# Patient Record
Sex: FEMALE | Race: WHITE | NOT HISPANIC OR LATINO | URBAN - METROPOLITAN AREA
[De-identification: names, ages, dates, MRNs, and addresses within clinical notes are randomized per-mention and may not be internally consistent; named-entity substitution may affect disease eponyms.]

---

## 2023-08-10 ENCOUNTER — HOSPITAL ENCOUNTER (OUTPATIENT)
Dept: DATA CONVERSION | Facility: HOSPITAL | Age: 38
Discharge: HOME | End: 2023-08-10

## 2023-08-10 DIAGNOSIS — E03.9 HYPOTHYROIDISM, UNSPECIFIED: ICD-10-CM

## 2023-08-10 LAB — T3FREE SERPL-MCNC: 6.2 PG/ML (ref 2.3–4.1)

## 2023-09-07 RX ORDER — INSULIN ASPART 100 [IU]/ML
INJECTION, SOLUTION INTRAVENOUS; SUBCUTANEOUS
COMMUNITY
Start: 2023-07-31

## 2023-09-07 RX ORDER — BLOOD-GLUCOSE,RECEIVER,CONT
EACH MISCELLANEOUS
COMMUNITY
Start: 2022-11-03

## 2023-09-07 RX ORDER — BLOOD-GLUCOSE SENSOR
EACH MISCELLANEOUS
COMMUNITY
Start: 2022-12-15 | End: 2024-01-07 | Stop reason: SDUPTHER

## 2023-09-07 RX ORDER — INSULIN PMP CART,AUT,G6/7,CNTR
EACH SUBCUTANEOUS
COMMUNITY
Start: 2022-11-03 | End: 2023-12-18 | Stop reason: WASHOUT

## 2023-09-07 RX ORDER — BLOOD-GLUCOSE TRANSMITTER
EACH MISCELLANEOUS
COMMUNITY
Start: 2023-07-18 | End: 2024-01-07 | Stop reason: SDUPTHER

## 2023-09-07 RX ORDER — INSULIN PMP CART,AUT,G6/7,CNTR
EACH SUBCUTANEOUS
COMMUNITY
Start: 2023-05-27 | End: 2023-12-18 | Stop reason: WASHOUT

## 2023-09-07 NOTE — PROGRESS NOTES
"Erica Carcamo is a 37 y.o. female was referred to Clinical Pharmacy Team to complete a comprehensive medication review (CMR) with a pharmacist as part of the [employer] diabetes program.      Referring Provider: Dr. Bain (endo)    Subjective   Not on File  No Pharmacies Listed    Diabetes  She has type 1 diabetes mellitus. Her disease course has been stable. Current diabetic treatment includes intensive insulin program. She is compliant with treatment all of the time.       Objective     There were no vitals taken for this visit.     LAB  No results found for: \"BILITOT\", \"CALCIUM\", \"CO2\", \"CL\", \"CREATININE\", \"GLUCOSE\", \"ALKPHOS\", \"K\", \"PROT\", \"NA\", \"AST\", \"ALT\", \"BUN\", \"ANIONGAP\", \"MG\", \"PHOS\", \"GGT\", \"LDH\", \"ALBUMIN\", \"AMYLASE\", \"LIPASE\", \"GFRF\", \"GFRMALE\"  No results found for: \"TRIG\", \"CHOL\", \"LDLCALC\", \"HDL\"  No results found for: \"HGBA1C\"    Current Outpatient Medications on File Prior to Visit   Medication Sig Dispense Refill    Dexcom G6  misc       Dexcom G6 Sensor device       Dexcom G6 Transmitter device       insulin aspart (NovoLOG) 100 unit/mL injection Use via pump      Omnipod 5 G6 Intro Kit, Gen 5, cartridge       Omnipod 5 G6 Pods, Gen 5, cartridge        No current facility-administered medications on file prior to visit.      ASSESSMENT/PLAN:  - Patient enrolled in  Employee diabetes program for $0 co-pays on diabetes medications/supplies. Enrollment should be active in 2-4 weeks and will be valid for one year dependant upon patient remaining on  prescription insurance plan and filling at a  pharmacy. After 1 year, patient will require another consult with the clinical pharmacy team.    Assessment/Plan   Problem List Items Addressed This Visit    None       Follow up: 10 months    Continue all meds under the continuation of care with the referring provider and clinical pharmacy team.    Oswald Jefferson, PharmD     Verbal consent to manage patient's drug therapy was " obtained from [the patient and/or an individual authorized to act on behalf of a patient]. They were informed they may decline to participate or withdraw from participation in pharmacy services at any time.

## 2023-09-26 PROBLEM — M99.04 SOMATIC DYSFUNCTION OF SACRAL REGION: Status: ACTIVE | Noted: 2023-09-26

## 2023-09-26 PROBLEM — E06.3 AUTOIMMUNE THYROIDITIS: Status: ACTIVE | Noted: 2023-09-26

## 2023-09-26 PROBLEM — E78.5 HYPERLIPIDEMIA: Status: ACTIVE | Noted: 2023-09-26

## 2023-09-26 PROBLEM — N83.8 COMPLEX CYST OF UTERINE ADNEXA: Status: ACTIVE | Noted: 2023-09-26

## 2023-09-26 PROBLEM — G47.00 INSOMNIA: Status: ACTIVE | Noted: 2023-09-26

## 2023-09-26 PROBLEM — E03.9 HYPOTHYROIDISM: Status: ACTIVE | Noted: 2023-09-26

## 2023-09-26 PROBLEM — E10.65 TYPE 1 DIABETES MELLITUS WITH HYPERGLYCEMIA (MULTI): Status: ACTIVE | Noted: 2023-09-26

## 2023-09-26 PROBLEM — E11.9 DIABETES MELLITUS WITHOUT COMPLICATION (MULTI): Status: ACTIVE | Noted: 2023-09-26

## 2023-09-26 PROBLEM — F41.8 DEPRESSION WITH ANXIETY: Status: ACTIVE | Noted: 2023-09-26

## 2023-09-26 PROBLEM — A41.9 SEPSIS (MULTI): Status: ACTIVE | Noted: 2023-09-26

## 2023-09-26 PROBLEM — R53.82 CHRONIC FATIGUE: Status: ACTIVE | Noted: 2023-09-26

## 2023-09-26 PROBLEM — F41.9 ANXIETY: Status: ACTIVE | Noted: 2023-09-26

## 2023-09-26 PROBLEM — M79.7 FIBROMYALGIA: Status: ACTIVE | Noted: 2023-09-26

## 2023-09-26 PROBLEM — E55.9 VITAMIN D DEFICIENCY: Status: ACTIVE | Noted: 2023-09-26

## 2023-09-26 PROBLEM — K90.0 CELIAC DISEASE (HHS-HCC): Status: ACTIVE | Noted: 2023-09-26

## 2023-09-26 PROBLEM — E86.0 DEHYDRATION: Status: ACTIVE | Noted: 2023-09-26

## 2023-09-26 PROBLEM — Z96.41 INSULIN PUMP STATUS: Status: ACTIVE | Noted: 2023-09-26

## 2023-09-26 PROBLEM — N12 PYELONEPHRITIS: Status: ACTIVE | Noted: 2023-09-26

## 2023-09-26 RX ORDER — BLOOD SUGAR DIAGNOSTIC
STRIP MISCELLANEOUS 3 TIMES DAILY
COMMUNITY
Start: 2021-08-27 | End: 2023-12-18 | Stop reason: WASHOUT

## 2023-09-26 RX ORDER — SERTRALINE HYDROCHLORIDE 100 MG/1
TABLET, FILM COATED ORAL
COMMUNITY
Start: 2023-07-31 | End: 2023-11-10 | Stop reason: SDUPTHER

## 2023-09-26 RX ORDER — THYROID, PORCINE 90 MG/1
90 TABLET ORAL
COMMUNITY
Start: 2022-02-08 | End: 2023-12-18 | Stop reason: WASHOUT

## 2023-09-26 RX ORDER — SODIUM FLUORIDE/POTASSIUM NIT 1.1 %-5 %
PASTE (ML) DENTAL
COMMUNITY
Start: 2023-07-28 | End: 2023-11-10 | Stop reason: SDUPTHER

## 2023-09-26 RX ORDER — SERTRALINE HYDROCHLORIDE 50 MG/1
TABLET, FILM COATED ORAL
COMMUNITY
Start: 2022-08-16 | End: 2023-10-02 | Stop reason: ALTCHOICE

## 2023-09-26 RX ORDER — THYROID, PORCINE 120 MG/1
120 TABLET ORAL
COMMUNITY
Start: 2022-04-05 | End: 2023-10-02 | Stop reason: ALTCHOICE

## 2023-09-26 RX ORDER — ONDANSETRON 4 MG/1
4 TABLET, FILM COATED ORAL EVERY 6 HOURS PRN
COMMUNITY
Start: 2023-03-26 | End: 2023-12-18 | Stop reason: WASHOUT

## 2023-09-26 RX ORDER — SEMAGLUTIDE 0.68 MG/ML
0.25 INJECTION, SOLUTION SUBCUTANEOUS
COMMUNITY
Start: 2023-07-31 | End: 2023-11-10 | Stop reason: SDUPTHER

## 2023-10-01 ENCOUNTER — PHARMACY VISIT (OUTPATIENT)
Dept: PHARMACY | Facility: CLINIC | Age: 38
End: 2023-10-01
Payer: COMMERCIAL

## 2023-10-01 PROCEDURE — RXMED WILLOW AMBULATORY MEDICATION CHARGE

## 2023-10-02 ENCOUNTER — OFFICE VISIT (OUTPATIENT)
Dept: ENDOCRINOLOGY | Facility: CLINIC | Age: 38
End: 2023-10-02
Payer: COMMERCIAL

## 2023-10-02 VITALS
SYSTOLIC BLOOD PRESSURE: 131 MMHG | WEIGHT: 164 LBS | DIASTOLIC BLOOD PRESSURE: 90 MMHG | HEART RATE: 83 BPM | BODY MASS INDEX: 24.94 KG/M2

## 2023-10-02 DIAGNOSIS — E11.9 DIABETES MELLITUS WITHOUT COMPLICATION (MULTI): ICD-10-CM

## 2023-10-02 DIAGNOSIS — E10.9 TYPE 1 DIABETES MELLITUS WITHOUT COMPLICATION (MULTI): Primary | ICD-10-CM

## 2023-10-02 LAB — POC HEMOGLOBIN A1C: 7.9 % (ref 4.2–6.5)

## 2023-10-02 PROCEDURE — 83036 HEMOGLOBIN GLYCOSYLATED A1C: CPT | Performed by: INTERNAL MEDICINE

## 2023-10-02 PROCEDURE — 3080F DIAST BP >= 90 MM HG: CPT | Performed by: INTERNAL MEDICINE

## 2023-10-02 PROCEDURE — 3046F HEMOGLOBIN A1C LEVEL >9.0%: CPT | Performed by: INTERNAL MEDICINE

## 2023-10-02 PROCEDURE — 3075F SYST BP GE 130 - 139MM HG: CPT | Performed by: INTERNAL MEDICINE

## 2023-10-02 PROCEDURE — 99214 OFFICE O/P EST MOD 30 MIN: CPT | Performed by: INTERNAL MEDICINE

## 2023-10-02 ASSESSMENT — ENCOUNTER SYMPTOMS
UNEXPECTED WEIGHT CHANGE: 0
SHORTNESS OF BREATH: 0

## 2023-10-02 NOTE — LETTER
Decrease insulin correction threshold to 160 mg/dl    Bolus   MN-12  1 unit per 6 gm carb  12-MN  1 unit per 4.5 gm carb    Follow up 3 months  A1c at next appointment

## 2023-10-02 NOTE — PATIENT INSTRUCTIONS
A1c 7.9%    Decrease insulin correction threshold to 160 mg/dl    Bolus   MN-12  1 unit per 6 gm carb  12-MN  1 unit per 4.5 gm carb    Follow up 3 months  A1c at next appointment

## 2023-10-02 NOTE — PROGRESS NOTES
Subjective   37-year old female  Duration of type 1 diabetes: 27 years      She can only use the arms for her OmniPod and any CGM  History of lipohypertrophy from prior injections at abdomen.      OmniPod 5    No DexCom sensor for the past 5 days  She was in automated mode continuously until then    The patient is currently checking the blood glucose 288 times per day.    Patient is using: continuous glucose monitor      No weight loss    Manual Basal total 24.4 units/day  MN 1  14 1.1  18 1 unit/h     Bolus   MN-12  1 unit per 6 gm carb  12-MN  1 unit per 5 gm carb     Correction factor 50  Target glucose 110  Insulin duration 3 hours     Changing pod every 3 days.     Hypoglycemia once per week when in automated mode      Review of Systems   Constitutional:  Negative for unexpected weight change.   Eyes:  Negative for visual disturbance.   Respiratory:  Negative for shortness of breath.    Cardiovascular:  Negative for chest pain.   Endocrine: Negative for polyuria.       Objective   /90 (BP Location: Right arm)   Pulse 83   Wt 74.4 kg (164 lb)   BMI 24.94 kg/m²   Physical Exam  HENT:      Head: Normocephalic.   Eyes:      Extraocular Movements: Extraocular movements intact.   Neck:      Comments: No goiter  Cardiovascular:      Pulses: Normal pulses.      Comments: No edema  Musculoskeletal:         General: No deformity.   Skin:     Comments: Callus formation 1st toes, no foot sores   Neurological:      Mental Status: She is alert.      Comments: No hand tremor         Lab Review      Assessment/Plan   There are no diagnoses linked to this encounter.    Type 1 diabetes mellitus  Insulin pump status    A1c 7.9%  A1c much improved  Postprandial hyperglycemia      RECOMMENDATIONS  Decrease insulin correction threshold to 160 mg/dl    Bolus   MN-12  1 unit per 6 gm carb  12-MN  1 unit per 4.5 gm carb    Follow up 3 months  A1c at next appointment

## 2023-11-01 DIAGNOSIS — E10.65 TYPE 1 DIABETES MELLITUS WITH HYPERGLYCEMIA (MULTI): Primary | ICD-10-CM

## 2023-11-01 RX ORDER — INSULIN ASPART 100 [IU]/ML
INJECTION, SOLUTION INTRAVENOUS; SUBCUTANEOUS
Qty: 90 ML | Refills: 1 | Status: SHIPPED | OUTPATIENT
Start: 2023-11-01 | End: 2023-12-18 | Stop reason: WASHOUT

## 2023-11-10 ENCOUNTER — PHARMACY VISIT (OUTPATIENT)
Dept: PHARMACY | Facility: CLINIC | Age: 38
End: 2023-11-10
Payer: COMMERCIAL

## 2023-11-10 ENCOUNTER — OFFICE VISIT (OUTPATIENT)
Dept: SPORTS MEDICINE | Facility: CLINIC | Age: 38
End: 2023-11-10
Payer: COMMERCIAL

## 2023-11-10 VITALS — SYSTOLIC BLOOD PRESSURE: 110 MMHG | HEART RATE: 80 BPM | DIASTOLIC BLOOD PRESSURE: 72 MMHG

## 2023-11-10 DIAGNOSIS — B02.23 ACUTE HERPES ZOSTER NEUROPATHY: Primary | ICD-10-CM

## 2023-11-10 PROCEDURE — 99213 OFFICE O/P EST LOW 20 MIN: CPT | Performed by: NURSE PRACTITIONER

## 2023-11-10 PROCEDURE — 3074F SYST BP LT 130 MM HG: CPT | Performed by: NURSE PRACTITIONER

## 2023-11-10 PROCEDURE — 3046F HEMOGLOBIN A1C LEVEL >9.0%: CPT | Performed by: NURSE PRACTITIONER

## 2023-11-10 PROCEDURE — 3078F DIAST BP <80 MM HG: CPT | Performed by: NURSE PRACTITIONER

## 2023-11-10 PROCEDURE — RXMED WILLOW AMBULATORY MEDICATION CHARGE

## 2023-11-10 RX ORDER — LIDOCAINE 50 MG/G
1 PATCH TOPICAL DAILY
Qty: 30 PATCH | Refills: 0 | Status: SHIPPED | OUTPATIENT
Start: 2023-11-10 | End: 2023-12-10

## 2023-11-10 RX ORDER — VALACYCLOVIR HYDROCHLORIDE 1 G/1
1000 TABLET, FILM COATED ORAL 3 TIMES DAILY
Qty: 21 TABLET | Refills: 0 | Status: SHIPPED | OUTPATIENT
Start: 2023-11-10 | End: 2023-11-17

## 2023-11-10 ASSESSMENT — ENCOUNTER SYMPTOMS
CONSTITUTIONAL NEGATIVE: 1
RESPIRATORY NEGATIVE: 1
CARDIOVASCULAR NEGATIVE: 1

## 2023-11-10 NOTE — PROGRESS NOTES
Established patient  History Of Present Illness  Erica Carcamo is a 38 y.o. female presenting with pain originating from under her LEFT arm along lateral aspect of rib cage. Patient reports for the last several days she has had burning pain with a small rash forming to the area which is painful to touch.      Past Medical History  She has no past medical history on file.    Surgical History  She has a past surgical history that includes Other surgical history (08/22/2022) and Other surgical history (08/22/2022).     Social History  She has no history on file for tobacco use, alcohol use, and drug use.    Family History  Family History   Problem Relation Name Age of Onset    Hyperlipidemia Mother          borderline    Hypertension Father          borderline    Hyperlipidemia Father          Allergies  Penicillin v, Gluten, and Penicillamine    Review of Systems  Review of Systems   Constitutional: Negative.    Respiratory: Negative.     Cardiovascular: Negative.         Last Recorded Vitals  /72   Pulse 80      Physical Exam  Constitutional:       Appearance: Normal appearance.   Skin:     Findings: Rash present. Rash is papular.             Comments: Left chest wall papular rash along T4 dermatome with itching and burning without streaking or drainage   Neurological:      Mental Status: She is alert.           Assessment   1. Acute herpes zoster neuropathy        Plan   Treatment or Intervention:  Acyclovir prescription sent to patient's pharmacy. Patient was notified of the benefits and risks of taking the medication to the level of their understanding.   Discussed with patient and family the use of over the counter lidocaine patches on the area to assist with pain.      Consultations/Referrals:  None at this time.    Follow-up:  No further follow-up is necessary.  The family is instructed to call or return with recurrent or worsening symptoms.  No follow-ups on file.    KARINE SETHI on 11/10/23 at 8:14  LIZ.     Jose Linares CNP

## 2023-11-10 NOTE — PROGRESS NOTES
Established patient  History Of Present Illness  Erica Carcamo is a 38 y.o. female presenting with  Left chest wall rash with burning  pain started 2 days ago, concerned she might have shingles.     Past Medical History  She has no past medical history on file.    Surgical History  She has a past surgical history that includes Other surgical history (08/22/2022) and Other surgical history (08/22/2022).     Social History  She has no history on file for tobacco use, alcohol use, and drug use.    Family History  Family History   Problem Relation Name Age of Onset    Hyperlipidemia Mother          borderline    Hypertension Father          borderline    Hyperlipidemia Father          Allergies  Penicillin v, Gluten, and Penicillamine    Review of Systems  Review of Systems   Constitutional: Negative.    Respiratory: Negative.     Cardiovascular: Negative.    Skin:  Positive for rash.        Last Recorded Vitals  /72   Pulse 80      Physical Exam  Constitutional:       General: She is not in acute distress.     Appearance: Normal appearance.   Skin:     Findings: Rash present.      Comments: Pt has linear rash along T4 dermatome on Left chest wall along bra line   Neurological:      Mental Status: She is alert.         Ortho Exam    Imaging and Diagnostics Review:  Radiology  No new radiographs were obtained today.    Findings:      Assessment   1. Acute herpes zoster neuropathy        Plan   Treatment or Intervention:  Take medications as directed, follow up if not improving or if pain worsens    Diagnostic studies:  No additional imaging or laboratory studies are needed at this time.        Activity Instructions, Restrictions, and Accommodations:  The family has been provided a note (after visit summary) outlining all current activity instructions, restrictions, and accommodations.    Consultations/Referrals:  None at this time.    Follow-up:  No further follow-up is necessary.  The family is instructed to call  or return with recurrent or worsening symptoms.  No follow-ups on file.    YUNIER MIKE on 11/10/23 at 8:13 AM.     Yunier Mike CNP

## 2023-11-28 ENCOUNTER — PHARMACY VISIT (OUTPATIENT)
Dept: PHARMACY | Facility: CLINIC | Age: 38
End: 2023-11-28
Payer: COMMERCIAL

## 2023-11-30 ENCOUNTER — PHARMACY VISIT (OUTPATIENT)
Dept: PHARMACY | Facility: CLINIC | Age: 38
End: 2023-11-30
Payer: COMMERCIAL

## 2023-11-30 DIAGNOSIS — E03.9 ACQUIRED HYPOTHYROIDISM: Primary | ICD-10-CM

## 2023-11-30 PROCEDURE — RXMED WILLOW AMBULATORY MEDICATION CHARGE

## 2023-11-30 RX ORDER — THYROID 90 MG/1
TABLET ORAL
Qty: 3 TABLET | Refills: 0 | OUTPATIENT
Start: 2023-11-30 | End: 2023-12-18 | Stop reason: WASHOUT

## 2023-11-30 RX ORDER — THYROID 90 MG/1
TABLET ORAL
Qty: 90 TABLET | Refills: 1 | Status: SHIPPED | OUTPATIENT
Start: 2023-11-30 | End: 2024-06-04 | Stop reason: SDUPTHER

## 2023-12-04 ENCOUNTER — PHARMACY VISIT (OUTPATIENT)
Dept: PHARMACY | Facility: CLINIC | Age: 38
End: 2023-12-04
Payer: COMMERCIAL

## 2023-12-04 PROCEDURE — RXMED WILLOW AMBULATORY MEDICATION CHARGE

## 2023-12-14 ENCOUNTER — APPOINTMENT (OUTPATIENT)
Dept: PRIMARY CARE | Facility: CLINIC | Age: 38
End: 2023-12-14
Payer: COMMERCIAL

## 2023-12-18 ENCOUNTER — OFFICE VISIT (OUTPATIENT)
Dept: PRIMARY CARE | Facility: CLINIC | Age: 38
End: 2023-12-18
Payer: COMMERCIAL

## 2023-12-18 ENCOUNTER — PHARMACY VISIT (OUTPATIENT)
Dept: PHARMACY | Facility: CLINIC | Age: 38
End: 2023-12-18
Payer: COMMERCIAL

## 2023-12-18 VITALS
HEART RATE: 74 BPM | DIASTOLIC BLOOD PRESSURE: 84 MMHG | WEIGHT: 172 LBS | OXYGEN SATURATION: 98 % | BODY MASS INDEX: 26.15 KG/M2 | SYSTOLIC BLOOD PRESSURE: 120 MMHG

## 2023-12-18 DIAGNOSIS — E10.65 TYPE 1 DIABETES MELLITUS WITH HYPERGLYCEMIA (MULTI): ICD-10-CM

## 2023-12-18 DIAGNOSIS — E03.9 ACQUIRED HYPOTHYROIDISM: ICD-10-CM

## 2023-12-18 DIAGNOSIS — F41.9 ANXIETY: Primary | ICD-10-CM

## 2023-12-18 PROCEDURE — 3046F HEMOGLOBIN A1C LEVEL >9.0%: CPT | Performed by: FAMILY MEDICINE

## 2023-12-18 PROCEDURE — 3074F SYST BP LT 130 MM HG: CPT | Performed by: FAMILY MEDICINE

## 2023-12-18 PROCEDURE — RXMED WILLOW AMBULATORY MEDICATION CHARGE

## 2023-12-18 PROCEDURE — 1036F TOBACCO NON-USER: CPT | Performed by: FAMILY MEDICINE

## 2023-12-18 PROCEDURE — 3079F DIAST BP 80-89 MM HG: CPT | Performed by: FAMILY MEDICINE

## 2023-12-18 PROCEDURE — 99214 OFFICE O/P EST MOD 30 MIN: CPT | Performed by: FAMILY MEDICINE

## 2023-12-18 RX ORDER — BUPROPION HYDROCHLORIDE 150 MG/1
150 TABLET ORAL EVERY MORNING
Qty: 30 TABLET | Refills: 2 | Status: SHIPPED | OUTPATIENT
Start: 2023-12-18 | End: 2024-01-04 | Stop reason: SDUPTHER

## 2023-12-18 RX ORDER — LORAZEPAM 0.5 MG/1
TABLET ORAL
Qty: 3 TABLET | Refills: 0 | Status: SHIPPED | OUTPATIENT
Start: 2023-12-18

## 2023-12-18 ASSESSMENT — ENCOUNTER SYMPTOMS
WHEEZING: 0
MYALGIAS: 0
PALPITATIONS: 0
CHILLS: 0
FEVER: 0
DIZZINESS: 0
FREQUENCY: 0
OCCASIONAL FEELINGS OF UNSTEADINESS: 0
TREMORS: 0
FATIGUE: 1
VOMITING: 0
LOSS OF SENSATION IN FEET: 0
DEPRESSION: 0
COUGH: 0
WEAKNESS: 0
HEMATURIA: 0
CONSTIPATION: 0
NAUSEA: 0
SHORTNESS OF BREATH: 0

## 2023-12-18 ASSESSMENT — PAIN SCALES - GENERAL: PAINLEVEL: 0-NO PAIN

## 2023-12-18 ASSESSMENT — PATIENT HEALTH QUESTIONNAIRE - PHQ9
SUM OF ALL RESPONSES TO PHQ9 QUESTIONS 1 AND 2: 0
1. LITTLE INTEREST OR PLEASURE IN DOING THINGS: NOT AT ALL
2. FEELING DOWN, DEPRESSED OR HOPELESS: NOT AT ALL

## 2023-12-18 NOTE — PROGRESS NOTES
Subjective   Patient ID: Erica Carcamo is a 38 y.o. female who presents for Follow-up.    Anxiety:          Anxiety F/U stable, worsening anxiety and depression and fatigue, irritable.          Medications significant benefit, zoloft.          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          Mood at patient's baseline, stable.          Sleep disturbance none.          Energy change decreased.          Weight change increased          Concentration decreased         Suicidal ideations none.      Hypothyroidism:          Follow Up she is on [place] thyroid           Hypothyroidism tolerating meds with no side effects.      Diabetes Mellitus:          she sees endocrine for her diabetes - she has improving control of her sugars on ozempic, her insurance will not cover it for her.         Review of Systems   Constitutional:  Positive for fatigue. Negative for chills and fever.   HENT:  Negative for ear pain, postnasal drip and tinnitus.    Respiratory:  Negative for cough, shortness of breath and wheezing.    Cardiovascular:  Negative for chest pain, palpitations and leg swelling.   Gastrointestinal:  Negative for constipation, nausea and vomiting.   Endocrine: Negative for cold intolerance.   Genitourinary:  Negative for frequency and hematuria.   Musculoskeletal:  Negative for myalgias.   Neurological:  Negative for dizziness, tremors and weakness.       Objective   /84   Pulse 74   Wt 78 kg (172 lb)   SpO2 98%   BMI 26.15 kg/m²     Physical Exam  Vitals reviewed.   Constitutional:       Appearance: Normal appearance.   Cardiovascular:      Rate and Rhythm: Normal rate and regular rhythm.      Heart sounds: Normal heart sounds. No murmur heard.  Pulmonary:      Breath sounds: Normal breath sounds.   Abdominal:      General: Abdomen is flat.  Bowel sounds are normal.      Palpations: Abdomen is soft.   Musculoskeletal:         General: No swelling.   Neurological:      General: No focal deficit present.      Mental Status: She is alert and oriented to person, place, and time.   Psychiatric:         Mood and Affect: Mood normal.         Behavior: Behavior normal.         Assessment/Plan   Problem List Items Addressed This Visit             ICD-10-CM       Endocrine/Metabolic    Hypothyroidism E03.9    Type 1 diabetes mellitus with hyperglycemia (CMS/[facility]) E10.65       Mental Health    Anxiety - Primary F41.9    Relevant Medications    buPROPion XL (Wellbutrin XL) 150 mg 24 hr tablet    LORazepam (Ativan) 0.5 mg tablet

## 2023-12-20 ENCOUNTER — APPOINTMENT (OUTPATIENT)
Dept: PRIMARY CARE | Facility: CLINIC | Age: 38
End: 2023-12-20
Payer: COMMERCIAL

## 2024-01-04 DIAGNOSIS — F41.9 ANXIETY: Primary | ICD-10-CM

## 2024-01-04 RX ORDER — BUPROPION HYDROCHLORIDE 300 MG/1
300 TABLET ORAL EVERY MORNING
Qty: 90 TABLET | Refills: 1 | Status: SHIPPED | OUTPATIENT
Start: 2024-01-04 | End: 2024-07-03

## 2024-01-04 RX ORDER — SERTRALINE HYDROCHLORIDE 100 MG/1
150 TABLET, FILM COATED ORAL DAILY
Qty: 135 TABLET | Refills: 3 | Status: SHIPPED | OUTPATIENT
Start: 2024-01-04 | End: 2024-01-05 | Stop reason: SDUPTHER

## 2024-01-05 ENCOUNTER — PHARMACY VISIT (OUTPATIENT)
Dept: PHARMACY | Facility: CLINIC | Age: 39
End: 2024-01-05
Payer: COMMERCIAL

## 2024-01-05 DIAGNOSIS — F41.9 ANXIETY: ICD-10-CM

## 2024-01-05 PROCEDURE — RXMED WILLOW AMBULATORY MEDICATION CHARGE

## 2024-01-05 RX ORDER — SERTRALINE HYDROCHLORIDE 100 MG/1
150 TABLET, FILM COATED ORAL DAILY
Qty: 135 TABLET | Refills: 3 | Status: SHIPPED | OUTPATIENT
Start: 2024-01-05 | End: 2025-01-04

## 2024-01-07 DIAGNOSIS — E10.9 TYPE 1 DIABETES MELLITUS WITHOUT COMPLICATION (MULTI): Primary | ICD-10-CM

## 2024-01-07 RX ORDER — BLOOD-GLUCOSE TRANSMITTER
EACH MISCELLANEOUS
Qty: 1 EACH | Refills: 3 | Status: SHIPPED | OUTPATIENT
Start: 2024-01-07

## 2024-01-07 RX ORDER — INSULIN PMP CART,AUT,G6/7,CNTR
1 EACH SUBCUTANEOUS
Qty: 30 EACH | Refills: 3 | Status: SHIPPED | OUTPATIENT
Start: 2024-01-07 | End: 2025-01-06

## 2024-01-07 RX ORDER — BLOOD-GLUCOSE SENSOR
EACH MISCELLANEOUS
Qty: 9 EACH | Refills: 3 | Status: SHIPPED | OUTPATIENT
Start: 2024-01-07

## 2024-01-08 PROCEDURE — RXMED WILLOW AMBULATORY MEDICATION CHARGE

## 2024-01-10 ENCOUNTER — PHARMACY VISIT (OUTPATIENT)
Dept: PHARMACY | Facility: CLINIC | Age: 39
End: 2024-01-10
Payer: COMMERCIAL

## 2024-01-12 ENCOUNTER — PHARMACY VISIT (OUTPATIENT)
Dept: PHARMACY | Facility: CLINIC | Age: 39
End: 2024-01-12

## 2024-01-12 PROCEDURE — RXMED WILLOW AMBULATORY MEDICATION CHARGE

## 2024-01-18 ENCOUNTER — OFFICE VISIT (OUTPATIENT)
Dept: PRIMARY CARE | Facility: CLINIC | Age: 39
End: 2024-01-18
Payer: COMMERCIAL

## 2024-01-18 VITALS
DIASTOLIC BLOOD PRESSURE: 84 MMHG | WEIGHT: 168 LBS | SYSTOLIC BLOOD PRESSURE: 130 MMHG | HEART RATE: 84 BPM | OXYGEN SATURATION: 97 % | BODY MASS INDEX: 26.37 KG/M2 | HEIGHT: 67 IN

## 2024-01-18 DIAGNOSIS — E03.9 ACQUIRED HYPOTHYROIDISM: ICD-10-CM

## 2024-01-18 DIAGNOSIS — F41.9 ANXIETY: Primary | ICD-10-CM

## 2024-01-18 DIAGNOSIS — Z12.31 SCREENING MAMMOGRAM FOR BREAST CANCER: ICD-10-CM

## 2024-01-18 DIAGNOSIS — E10.65 TYPE 1 DIABETES MELLITUS WITH HYPERGLYCEMIA (MULTI): ICD-10-CM

## 2024-01-18 PROCEDURE — 3079F DIAST BP 80-89 MM HG: CPT | Performed by: FAMILY MEDICINE

## 2024-01-18 PROCEDURE — 1036F TOBACCO NON-USER: CPT | Performed by: FAMILY MEDICINE

## 2024-01-18 PROCEDURE — 99214 OFFICE O/P EST MOD 30 MIN: CPT | Performed by: FAMILY MEDICINE

## 2024-01-18 PROCEDURE — 3075F SYST BP GE 130 - 139MM HG: CPT | Performed by: FAMILY MEDICINE

## 2024-01-18 ASSESSMENT — PAIN SCALES - GENERAL: PAINLEVEL: 0-NO PAIN

## 2024-01-18 ASSESSMENT — ENCOUNTER SYMPTOMS
LOSS OF SENSATION IN FEET: 0
DEPRESSION: 0
OCCASIONAL FEELINGS OF UNSTEADINESS: 0

## 2024-01-18 ASSESSMENT — PATIENT HEALTH QUESTIONNAIRE - PHQ9
SUM OF ALL RESPONSES TO PHQ9 QUESTIONS 1 AND 2: 0
2. FEELING DOWN, DEPRESSED OR HOPELESS: NOT AT ALL
1. LITTLE INTEREST OR PLEASURE IN DOING THINGS: NOT AT ALL

## 2024-01-18 NOTE — PROGRESS NOTES
"Subjective   Patient ID: Erica Carcamo is a 38 y.o. female who presents for Follow-up.    Anxiety:          Anxiety F/U stable - improved with addition of wellbutrin         Medications significant benefit, zoloft.          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          Mood at patient's baseline, stable.          Sleep disturbance none.          Energy change improved         Weight change increased          Concentration - improved         Suicidal ideations none.      Hypothyroidism:          Follow Up she is on [place] thyroid           Hypothyroidism tolerating meds with no side effects. Need to recheck labs     Diabetes Mellitus:          she sees endocrine for her diabetes - she has improving control of her sugars on ozempic, her insurance will not cover it for her. Her sugars dropped from 9.2 to 7.9 on one month of low dose ozempic              Review of Systems   Constitutional:  Positive for fatigue. Negative for chills and fever.   HENT:  Negative for ear pain, postnasal drip and tinnitus.    Respiratory:  Negative for cough, shortness of breath and wheezing.    Cardiovascular:  Negative for chest pain, palpitations and leg swelling.   Gastrointestinal:  Negative for constipation, nausea and vomiting.   Endocrine: Negative for cold intolerance.   Genitourinary:  Negative for frequency and hematuria.   Musculoskeletal:  Negative for arthralgias and myalgias.   Neurological:  Negative for dizziness, tremors and weakness.   Psychiatric/Behavioral:  Negative for confusion. The patient is not nervous/anxious.        Objective   /84   Pulse 84   Ht 1.702 m (5' 7\")   Wt 76.2 kg (168 lb)   SpO2 97%   BMI 26.31 kg/m²     Physical Exam  Vitals reviewed.   Constitutional:       Appearance: Normal appearance.   Cardiovascular:      Rate " and Rhythm: Normal rate and regular rhythm.      Heart sounds: Normal heart sounds. No murmur heard.  Pulmonary:      Breath sounds: Normal breath sounds.   Abdominal:      General: Abdomen is flat. Bowel sounds are normal.      Palpations: Abdomen is soft.   Musculoskeletal:         General: No swelling.   Neurological:      Mental Status: She is alert.         Assessment/Plan   Problem List Items Addressed This Visit             ICD-10-CM    Anxiety - Primary F41.9    Hypothyroidism E03.9    Relevant Orders    TSH with reflex to Free T4 if abnormal    T3, free    Thyroxine, Free    Type 1 diabetes mellitus with hyperglycemia (CMS/HCC) E10.65    Relevant Orders    Comprehensive Metabolic Panel    Hemoglobin A1C    Albumin , Urine Random     Other Visit Diagnoses         Codes    Screening mammogram for breast cancer     Z12.31    Relevant Orders    BI mammo bilateral screening tomosynthesis

## 2024-01-21 ASSESSMENT — ENCOUNTER SYMPTOMS
CONSTIPATION: 0
VOMITING: 0
NERVOUS/ANXIOUS: 0
FATIGUE: 1
NAUSEA: 0
ARTHRALGIAS: 0
WEAKNESS: 0
CHILLS: 0
FEVER: 0
FREQUENCY: 0
MYALGIAS: 0
CONFUSION: 0
TREMORS: 0
PALPITATIONS: 0
DIZZINESS: 0
COUGH: 0
WHEEZING: 0
SHORTNESS OF BREATH: 0
HEMATURIA: 0

## 2024-01-22 ENCOUNTER — OFFICE VISIT (OUTPATIENT)
Dept: ENDOCRINOLOGY | Facility: CLINIC | Age: 39
End: 2024-01-22
Payer: COMMERCIAL

## 2024-01-22 VITALS
DIASTOLIC BLOOD PRESSURE: 87 MMHG | SYSTOLIC BLOOD PRESSURE: 126 MMHG | HEART RATE: 88 BPM | BODY MASS INDEX: 26.16 KG/M2 | WEIGHT: 167 LBS

## 2024-01-23 ENCOUNTER — TELEMEDICINE (OUTPATIENT)
Dept: ENDOCRINOLOGY | Facility: CLINIC | Age: 39
End: 2024-01-23
Payer: COMMERCIAL

## 2024-01-23 DIAGNOSIS — E10.9 TYPE 1 DIABETES MELLITUS WITHOUT COMPLICATION (MULTI): ICD-10-CM

## 2024-01-23 LAB — POC HEMOGLOBIN A1C: 9.7 % (ref 4.2–6.5)

## 2024-01-23 PROCEDURE — 83036 HEMOGLOBIN GLYCOSYLATED A1C: CPT | Performed by: INTERNAL MEDICINE

## 2024-01-23 PROCEDURE — 99213 OFFICE O/P EST LOW 20 MIN: CPT | Performed by: INTERNAL MEDICINE

## 2024-01-23 ASSESSMENT — ENCOUNTER SYMPTOMS
ENDOCRINE COMMENTS: AS ABOVE
UNEXPECTED WEIGHT CHANGE: 0

## 2024-01-23 NOTE — PROGRESS NOTES
History Of Present Illness  Erica Carcamo is a 38 y.o. female     Duration of type 1 diabetes mellitus:  28 years    History of lipohypertrophy from prior injections at abdomen.      Semaglutide 0.25 mg/week provided by Dr. Milan  Planning increase to 0.5 mg  Patient has seen improved A1c on Semaglutide in the past  She had not had weight loss on semaglutide    Insulin pump status  OmniPod 5  Insulin:  Novolog    Automated mode: 56%  Automated basal:  20.7 units/day    Manual Basal total 24.4 units/day  MN 1  14 1.1  18 1 unit/h     Bolus   MN-12  1 unit per 6 gm carb  12-MN  1 unit per 4.5 gm carb     Correction factor 50  Target glucose 110  Insulin duration 3 hours     Changing pod every 3 days.   DexCom G6  Patient is testing glucose 288 times daily  Records reviewed, on file    Past Medical History  She has a past medical history of Anxiety, Diabetes mellitus (CMS/HCC), Disease of thyroid gland, Headache, and Urinary tract infection.    Surgical History  She has a past surgical history that includes Other surgical history (2022); Other surgical history (2022);  section, low transverse; and Tubal ligation.     Social History  She reports that she has never smoked. She has never been exposed to tobacco smoke. She has never used smokeless tobacco. She reports current alcohol use. She reports that she does not use drugs.    Family History  Family History   Problem Relation Name Age of Onset    Hyperlipidemia Mother Savanah Lyons         borderline    Depression Mother Savanah Lyons     Hypertension Father Ismael Lyons         borderline    Hyperlipidemia Father Ismael Lyons     Alcohol abuse Father Ismael Lyons     Arthritis Father Ismael Lyons     Depression Father Ismael Lyons     Stroke Father Ismael Lyons        Medications  Current Outpatient Medications   Medication Instructions    buPROPion XL (WELLBUTRIN XL) 300 mg, oral, Every morning, Do not crush,  chew, or split.    Dexcom G6  misc     Dexcom G6 Sensor device For continuous glucose monitoring.  Change every 10 days.    Dexcom G6 Transmitter device For continuous glucose monitoring.  Change every 90 days.    insulin aspart (NovoLOG) 100 unit/mL injection Use via pump    insulin pump cart,automated,BT (Omnipod 5 G6 Pods, Gen 5,) cartridge Use as directed. Replace every 3 days.    LORazepam (Ativan) 0.5 mg tablet Take 0.5-1 tablet by mouth once a day as needed for severe anxiety. Do not take and drive.    semaglutide 0.25 mg or 0.5 mg (2 mg/3 mL) pen injector INJECT 0.25 MG UNDER THE SKIN ONCE WEEKLY THEN INCREASE TO 0.5MG UNDER THE SKIN ONCE WEEKLY    sertraline (ZOLOFT) 150 mg, oral, Daily    thyroid, pork, (NP Thyroid) 90 mg tablet TAKE 1 TABLET BY MOUTH ON AN EMPTY STOMACH AS DIRECTED       Allergies  Penicillin v, Gluten, and Penicillamine    Review of Systems   Constitutional:  Negative for unexpected weight change.   HENT:  Negative for congestion.    Endocrine:        As above         Last Recorded Vitals  There were no vitals taken for this visit.    Physical Exam  Constitutional:       General: She is not in acute distress.  HENT:      Head: Normocephalic.   Eyes:      Extraocular Movements: Extraocular movements intact.   Neurological:      Mental Status: She is alert.   Psychiatric:         Attention and Perception: Attention normal.          Relevant Results  Glucose (MG/DL)   Date Value   06/29/2023 135 (H)   03/29/2023 138 (H)   03/28/2023 125 (H)     POC HEMOGLOBIN A1c (%)   Date Value   01/22/2024 9.7 (A)   10/02/2023 7.9 (A)     Hemoglobin A1C (%)   Date Value   06/29/2023 9.2 (H)   03/26/2023 10.0 (H)     Bicarbonate (MMOL/L)   Date Value   06/29/2023 27   03/29/2023 25   03/28/2023 24     Urea Nitrogen (MG/DL)   Date Value   06/29/2023 5 (L)   03/29/2023 3 (L)   03/28/2023 4 (L)     Creatinine (MG/DL)   Date Value   06/29/2023 0.6   03/29/2023 0.6   03/28/2023 0.7     Lab Results    Component Value Date    CHOL 205 (H) 06/29/2023    CHOL 192 08/20/2021     Lab Results   Component Value Date     06/29/2023     08/20/2021     Lab Results   Component Value Date    LDLCALC 91 06/29/2023    LDLCALC 68 08/20/2021     Lab Results   Component Value Date    TRIG 50 06/29/2023    TRIG 52 08/20/2021     Lab Results   Component Value Date    TSH 0.02 (L) 06/29/2023       IMPRESSION  TYPE 1 DIABETES MELLITUS  INSULIN PUMP STATUS  A1c elevated  Postprandial hypoglycemia  Patient states she has had a favorable response to semaglutide, albeit without weight loss.  Semaglutide is not FDA approved for type 1 diabetes.     RECOMMENDATIONS  Bolus   MN-12  1 unit per 5.5 gm carb  12-MN  1 unit per 4 gm carb    Defer semaglutide Rx to Dr. Milan.    Follow up 3 months  A1c at next appointment if not already done.

## 2024-01-31 ENCOUNTER — LAB (OUTPATIENT)
Dept: LAB | Facility: LAB | Age: 39
End: 2024-01-31
Payer: COMMERCIAL

## 2024-01-31 ENCOUNTER — HOSPITAL ENCOUNTER (OUTPATIENT)
Dept: RADIOLOGY | Facility: CLINIC | Age: 39
Discharge: HOME | End: 2024-01-31
Payer: COMMERCIAL

## 2024-01-31 VITALS — WEIGHT: 163 LBS | BODY MASS INDEX: 25.58 KG/M2 | HEIGHT: 67 IN

## 2024-01-31 DIAGNOSIS — Z12.31 SCREENING MAMMOGRAM FOR BREAST CANCER: ICD-10-CM

## 2024-01-31 DIAGNOSIS — E03.9 ACQUIRED HYPOTHYROIDISM: ICD-10-CM

## 2024-01-31 DIAGNOSIS — E10.65 TYPE 1 DIABETES MELLITUS WITH HYPERGLYCEMIA (MULTI): ICD-10-CM

## 2024-01-31 DIAGNOSIS — Z12.31 SCREENING MAMMOGRAM FOR BREAST CANCER: Primary | ICD-10-CM

## 2024-01-31 LAB
ALBUMIN SERPL BCP-MCNC: 4.4 G/DL (ref 3.4–5)
ALP SERPL-CCNC: 88 U/L (ref 33–110)
ALT SERPL W P-5'-P-CCNC: 45 U/L (ref 7–45)
ANION GAP SERPL CALC-SCNC: 16 MMOL/L (ref 10–20)
AST SERPL W P-5'-P-CCNC: 45 U/L (ref 9–39)
BILIRUB SERPL-MCNC: 0.5 MG/DL (ref 0–1.2)
BUN SERPL-MCNC: 7 MG/DL (ref 6–23)
CALCIUM SERPL-MCNC: 9.2 MG/DL (ref 8.6–10.6)
CHLORIDE SERPL-SCNC: 96 MMOL/L (ref 98–107)
CO2 SERPL-SCNC: 27 MMOL/L (ref 21–32)
CREAT SERPL-MCNC: 0.74 MG/DL (ref 0.5–1.05)
EGFRCR SERPLBLD CKD-EPI 2021: >90 ML/MIN/1.73M*2
EST. AVERAGE GLUCOSE BLD GHB EST-MCNC: 197 MG/DL
GLUCOSE SERPL-MCNC: 306 MG/DL (ref 74–99)
HBA1C MFR BLD: 8.5 %
POTASSIUM SERPL-SCNC: 3.5 MMOL/L (ref 3.5–5.3)
PROT SERPL-MCNC: 7.1 G/DL (ref 6.4–8.2)
SODIUM SERPL-SCNC: 135 MMOL/L (ref 136–145)
T3FREE SERPL-MCNC: 3.6 PG/ML (ref 2.3–4.2)
T4 FREE SERPL-MCNC: 0.69 NG/DL (ref 0.78–1.48)
TSH SERPL-ACNC: 4.11 MIU/L (ref 0.44–3.98)

## 2024-01-31 PROCEDURE — 84481 FREE ASSAY (FT-3): CPT

## 2024-01-31 PROCEDURE — 84439 ASSAY OF FREE THYROXINE: CPT

## 2024-01-31 PROCEDURE — 36415 COLL VENOUS BLD VENIPUNCTURE: CPT

## 2024-01-31 PROCEDURE — 80053 COMPREHEN METABOLIC PANEL: CPT

## 2024-01-31 PROCEDURE — 77067 SCR MAMMO BI INCL CAD: CPT

## 2024-01-31 PROCEDURE — 84443 ASSAY THYROID STIM HORMONE: CPT

## 2024-01-31 PROCEDURE — 83036 HEMOGLOBIN GLYCOSYLATED A1C: CPT

## 2024-02-01 ENCOUNTER — TELEPHONE (OUTPATIENT)
Dept: PRIMARY CARE | Facility: CLINIC | Age: 39
End: 2024-02-01
Payer: COMMERCIAL

## 2024-02-01 NOTE — TELEPHONE ENCOUNTER
----- Message from Ankur Milan MD sent at 1/31/2024 10:02 PM EST -----    ----- Message -----  From: Lab, Background User  Sent: 1/31/2024   7:01 PM EST  To: Ankur Milan MD

## 2024-03-06 PROCEDURE — RXMED WILLOW AMBULATORY MEDICATION CHARGE

## 2024-03-07 ENCOUNTER — PHARMACY VISIT (OUTPATIENT)
Dept: PHARMACY | Facility: CLINIC | Age: 39
End: 2024-03-07
Payer: COMMERCIAL

## 2024-03-07 PROCEDURE — RXOTC WILLOW AMBULATORY OTC CHARGE

## 2024-03-28 PROCEDURE — RXMED WILLOW AMBULATORY MEDICATION CHARGE

## 2024-04-02 ENCOUNTER — PHARMACY VISIT (OUTPATIENT)
Dept: PHARMACY | Facility: CLINIC | Age: 39
End: 2024-04-02
Payer: COMMERCIAL

## 2024-04-02 PROCEDURE — RXMED WILLOW AMBULATORY MEDICATION CHARGE

## 2024-04-03 ENCOUNTER — PHARMACY VISIT (OUTPATIENT)
Dept: PHARMACY | Facility: CLINIC | Age: 39
End: 2024-04-03
Payer: COMMERCIAL

## 2024-04-18 ENCOUNTER — OFFICE VISIT (OUTPATIENT)
Dept: PRIMARY CARE | Facility: CLINIC | Age: 39
End: 2024-04-18
Payer: COMMERCIAL

## 2024-04-18 VITALS
OXYGEN SATURATION: 99 % | HEIGHT: 67 IN | WEIGHT: 155 LBS | DIASTOLIC BLOOD PRESSURE: 78 MMHG | BODY MASS INDEX: 24.33 KG/M2 | SYSTOLIC BLOOD PRESSURE: 118 MMHG | HEART RATE: 90 BPM

## 2024-04-18 DIAGNOSIS — E10.65 TYPE 1 DIABETES MELLITUS WITH HYPERGLYCEMIA (MULTI): Primary | ICD-10-CM

## 2024-04-18 DIAGNOSIS — Z00.00 ROUTINE GENERAL MEDICAL EXAMINATION AT A HEALTH CARE FACILITY: ICD-10-CM

## 2024-04-18 DIAGNOSIS — E55.9 VITAMIN D DEFICIENCY: ICD-10-CM

## 2024-04-18 DIAGNOSIS — R53.83 FATIGUE, UNSPECIFIED TYPE: ICD-10-CM

## 2024-04-18 DIAGNOSIS — E03.9 ACQUIRED HYPOTHYROIDISM: ICD-10-CM

## 2024-04-18 DIAGNOSIS — F41.9 ANXIETY: ICD-10-CM

## 2024-04-18 DIAGNOSIS — E78.2 MIXED HYPERLIPIDEMIA: ICD-10-CM

## 2024-04-18 DIAGNOSIS — R35.1 NOCTURIA: ICD-10-CM

## 2024-04-18 PROCEDURE — 3078F DIAST BP <80 MM HG: CPT | Performed by: FAMILY MEDICINE

## 2024-04-18 PROCEDURE — 3052F HG A1C>EQUAL 8.0%<EQUAL 9.0%: CPT | Performed by: FAMILY MEDICINE

## 2024-04-18 PROCEDURE — 3074F SYST BP LT 130 MM HG: CPT | Performed by: FAMILY MEDICINE

## 2024-04-18 PROCEDURE — 99214 OFFICE O/P EST MOD 30 MIN: CPT | Performed by: FAMILY MEDICINE

## 2024-04-18 RX ORDER — SODIUM FLUORIDE1.1%, POTASSIUM NITRATE 5% 5.8; 57.5 MG/ML; MG/ML
GEL, DENTIFRICE DENTAL
COMMUNITY
Start: 2023-05-22 | End: 2024-05-21

## 2024-04-18 ASSESSMENT — PATIENT HEALTH QUESTIONNAIRE - PHQ9
1. LITTLE INTEREST OR PLEASURE IN DOING THINGS: NOT AT ALL
SUM OF ALL RESPONSES TO PHQ9 QUESTIONS 1 AND 2: 0
2. FEELING DOWN, DEPRESSED OR HOPELESS: NOT AT ALL

## 2024-04-18 ASSESSMENT — ENCOUNTER SYMPTOMS
DEPRESSION: 0
LOSS OF SENSATION IN FEET: 0
OCCASIONAL FEELINGS OF UNSTEADINESS: 0

## 2024-04-18 ASSESSMENT — PAIN SCALES - GENERAL: PAINLEVEL: 0-NO PAIN

## 2024-04-18 NOTE — PROGRESS NOTES
"Subjective   Patient ID: Erica Carcamo is a 38 y.o. female who presents for Follow-up.    Anxiety:          Anxiety F/U stable - improved with addition of wellbutrin         Medications significant benefit, zoloft.          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          Mood at patient's baseline, stable.          Sleep disturbance none.          Energy change improved         Weight change increased          Concentration - improved         Suicidal ideations none.      Hypothyroidism:          Follow Up she is on [place] thyroid           Hypothyroidism tolerating meds with no side effects. Need to recheck labs     Diabetes Mellitus:          she sees endocrine for her diabetes - she has improving control of her sugars on ozempic, her insurance will not cover it for her. Her sugars dropped from 9.2 to 7.9 on one month of low dose ozempic - she feels much better, has lost weight and has better energy, sugars continue to improve  Hyperlipidemia - due for labs - encourage diet and exercise and a statin if she does not plan to get pregnant           Review of Systems   Constitutional:  Positive for fatigue. Negative for chills and fever.   HENT:  Negative for ear pain, postnasal drip and tinnitus.    Respiratory:  Negative for cough, shortness of breath and wheezing.    Cardiovascular:  Negative for chest pain, palpitations and leg swelling.   Gastrointestinal:  Negative for constipation, nausea and vomiting.   Endocrine: Negative for cold intolerance.   Genitourinary:  Negative for frequency and hematuria.   Musculoskeletal:  Negative for arthralgias and myalgias.   Neurological:  Negative for dizziness, tremors and weakness.   Nocturia, vit D def    Objective   /78   Pulse 90   Ht 1.702 m (5' 7\")   Wt 70.3 kg (155 lb)   SpO2 99%   BMI " 24.28 kg/m²     Physical Exam  Vitals reviewed.   Constitutional:       General: She is not in acute distress.     Appearance: Normal appearance. She is not ill-appearing.   Cardiovascular:      Rate and Rhythm: Normal rate and regular rhythm.      Pulses:           Dorsalis pedis pulses are 2+ on the right side and 2+ on the left side.        Posterior tibial pulses are 2+ on the right side and 2+ on the left side.      Heart sounds: Normal heart sounds. No murmur heard.  Pulmonary:      Breath sounds: Normal breath sounds.   Musculoskeletal:         General: No swelling.   Feet:      Right foot:      Protective Sensation: 6 sites tested.        Skin integrity: Skin integrity normal.      Left foot:      Protective Sensation: 6 sites tested.        Skin integrity: Skin integrity normal.   Skin:     Findings: No rash.   Neurological:      General: No focal deficit present.      Mental Status: She is alert and oriented to person, place, and time.   Psychiatric:         Mood and Affect: Mood normal.         Behavior: Behavior normal.         Assessment/Plan   Problem List Items Addressed This Visit             ICD-10-CM    Anxiety F41.9    Hyperlipidemia E78.5    Relevant Orders    Comprehensive Metabolic Panel    Lipid Panel    Hypothyroidism E03.9    Relevant Orders    TSH with reflex to Free T4 if abnormal    T3, free    Thyroxine, Free    Type 1 diabetes mellitus with hyperglycemia (Multi) - Primary E10.65    Relevant Orders    Albumin , Urine Random    Hemoglobin A1C    Vitamin D deficiency E55.9     Other Visit Diagnoses         Codes    Fatigue, unspecified type     R53.83    Relevant Orders    CBC and Auto Differential    Nocturia     R35.1    Relevant Orders    Urinalysis with Reflex Culture and Microscopic    Routine general medical examination at a health care facility     Z00.00    Relevant Orders    Urinalysis with Reflex Culture and Microscopic    Albumin , Urine Random    Comprehensive Metabolic Panel     CBC and Auto Differential    TSH with reflex to Free T4 if abnormal    T3, free    Thyroxine, Free    Lipid Panel    Hemoglobin A1C

## 2024-04-20 ASSESSMENT — ENCOUNTER SYMPTOMS
PALPITATIONS: 0
CHILLS: 0
ARTHRALGIAS: 0
SHORTNESS OF BREATH: 0
WHEEZING: 0
TREMORS: 0
VOMITING: 0
DIZZINESS: 0
FEVER: 0
NAUSEA: 0
FATIGUE: 1
HEMATURIA: 0
MYALGIAS: 0
COUGH: 0
FREQUENCY: 0
WEAKNESS: 0
CONSTIPATION: 0

## 2024-05-21 ENCOUNTER — PHARMACY VISIT (OUTPATIENT)
Dept: PHARMACY | Facility: CLINIC | Age: 39
End: 2024-05-21
Payer: COMMERCIAL

## 2024-05-21 PROCEDURE — RXMED WILLOW AMBULATORY MEDICATION CHARGE

## 2024-06-04 DIAGNOSIS — E03.9 ACQUIRED HYPOTHYROIDISM: ICD-10-CM

## 2024-06-04 PROCEDURE — RXMED WILLOW AMBULATORY MEDICATION CHARGE

## 2024-06-04 RX ORDER — THYROID 90 MG/1
TABLET ORAL
Qty: 90 TABLET | Refills: 1 | Status: SHIPPED | OUTPATIENT
Start: 2024-06-04 | End: 2025-06-04

## 2024-06-12 ENCOUNTER — PHARMACY VISIT (OUTPATIENT)
Dept: PHARMACY | Facility: CLINIC | Age: 39
End: 2024-06-12
Payer: COMMERCIAL

## 2024-06-26 DIAGNOSIS — F41.9 ANXIETY: ICD-10-CM

## 2024-06-26 PROCEDURE — RXMED WILLOW AMBULATORY MEDICATION CHARGE

## 2024-06-26 RX ORDER — BUPROPION HYDROCHLORIDE 300 MG/1
300 TABLET ORAL EVERY MORNING
Qty: 90 TABLET | Refills: 1 | Status: SHIPPED | OUTPATIENT
Start: 2024-06-26 | End: 2024-12-23

## 2024-07-01 ENCOUNTER — PHARMACY VISIT (OUTPATIENT)
Dept: PHARMACY | Facility: CLINIC | Age: 39
End: 2024-07-01
Payer: COMMERCIAL

## 2024-07-01 PROCEDURE — RXMED WILLOW AMBULATORY MEDICATION CHARGE

## 2024-07-08 ENCOUNTER — PHARMACY VISIT (OUTPATIENT)
Dept: PHARMACY | Facility: CLINIC | Age: 39
End: 2024-07-08
Payer: COMMERCIAL

## 2024-07-18 ENCOUNTER — LAB (OUTPATIENT)
Dept: LAB | Facility: LAB | Age: 39
End: 2024-07-18
Payer: COMMERCIAL

## 2024-07-18 DIAGNOSIS — E10.65 TYPE 1 DIABETES MELLITUS WITH HYPERGLYCEMIA (MULTI): ICD-10-CM

## 2024-07-18 DIAGNOSIS — E78.2 MIXED HYPERLIPIDEMIA: ICD-10-CM

## 2024-07-18 DIAGNOSIS — E03.9 ACQUIRED HYPOTHYROIDISM: ICD-10-CM

## 2024-07-18 DIAGNOSIS — R53.83 FATIGUE, UNSPECIFIED TYPE: ICD-10-CM

## 2024-07-18 DIAGNOSIS — Z00.00 ROUTINE GENERAL MEDICAL EXAMINATION AT A HEALTH CARE FACILITY: ICD-10-CM

## 2024-07-18 LAB
ALBUMIN SERPL BCP-MCNC: 4.3 G/DL (ref 3.4–5)
ALP SERPL-CCNC: 135 U/L (ref 33–110)
ALT SERPL W P-5'-P-CCNC: 18 U/L (ref 7–45)
ANION GAP SERPL CALC-SCNC: 14 MMOL/L (ref 10–20)
AST SERPL W P-5'-P-CCNC: 28 U/L (ref 9–39)
BASOPHILS # BLD AUTO: 0.04 X10*3/UL (ref 0–0.1)
BASOPHILS NFR BLD AUTO: 0.7 %
BILIRUB SERPL-MCNC: 0.7 MG/DL (ref 0–1.2)
BUN SERPL-MCNC: 7 MG/DL (ref 6–23)
CALCIUM SERPL-MCNC: 9.4 MG/DL (ref 8.6–10.6)
CHLORIDE SERPL-SCNC: 97 MMOL/L (ref 98–107)
CHOLEST SERPL-MCNC: 217 MG/DL (ref 0–199)
CHOLESTEROL/HDL RATIO: 2.1
CO2 SERPL-SCNC: 29 MMOL/L (ref 21–32)
CREAT SERPL-MCNC: 0.7 MG/DL (ref 0.5–1.05)
EGFRCR SERPLBLD CKD-EPI 2021: >90 ML/MIN/1.73M*2
EOSINOPHIL # BLD AUTO: 0.03 X10*3/UL (ref 0–0.7)
EOSINOPHIL NFR BLD AUTO: 0.6 %
ERYTHROCYTE [DISTWIDTH] IN BLOOD BY AUTOMATED COUNT: 11.9 % (ref 11.5–14.5)
EST. AVERAGE GLUCOSE BLD GHB EST-MCNC: 177 MG/DL
GLUCOSE SERPL-MCNC: 257 MG/DL (ref 74–99)
HBA1C MFR BLD: 7.8 %
HCT VFR BLD AUTO: 45.7 % (ref 36–46)
HDLC SERPL-MCNC: 102.3 MG/DL
HGB BLD-MCNC: 15.4 G/DL (ref 12–16)
IMM GRANULOCYTES # BLD AUTO: 0.03 X10*3/UL (ref 0–0.7)
IMM GRANULOCYTES NFR BLD AUTO: 0.6 % (ref 0–0.9)
LDLC SERPL CALC-MCNC: 102 MG/DL
LYMPHOCYTES # BLD AUTO: 0.94 X10*3/UL (ref 1.2–4.8)
LYMPHOCYTES NFR BLD AUTO: 17.3 %
MCH RBC QN AUTO: 32.2 PG (ref 26–34)
MCHC RBC AUTO-ENTMCNC: 33.7 G/DL (ref 32–36)
MCV RBC AUTO: 96 FL (ref 80–100)
MONOCYTES # BLD AUTO: 0.54 X10*3/UL (ref 0.1–1)
MONOCYTES NFR BLD AUTO: 9.9 %
NEUTROPHILS # BLD AUTO: 3.85 X10*3/UL (ref 1.2–7.7)
NEUTROPHILS NFR BLD AUTO: 70.9 %
NON HDL CHOLESTEROL: 115 MG/DL (ref 0–149)
NRBC BLD-RTO: 0 /100 WBCS (ref 0–0)
PLATELET # BLD AUTO: 188 X10*3/UL (ref 150–450)
POTASSIUM SERPL-SCNC: 4.1 MMOL/L (ref 3.5–5.3)
PROT SERPL-MCNC: 7.1 G/DL (ref 6.4–8.2)
RBC # BLD AUTO: 4.78 X10*6/UL (ref 4–5.2)
SODIUM SERPL-SCNC: 136 MMOL/L (ref 136–145)
T3FREE SERPL-MCNC: 4.5 PG/ML (ref 2.3–4.2)
T4 FREE SERPL-MCNC: 0.95 NG/DL (ref 0.78–1.48)
TRIGL SERPL-MCNC: 64 MG/DL (ref 0–149)
TSH SERPL-ACNC: 0.35 MIU/L (ref 0.44–3.98)
VLDL: 13 MG/DL (ref 0–40)
WBC # BLD AUTO: 5.4 X10*3/UL (ref 4.4–11.3)

## 2024-07-18 PROCEDURE — 84439 ASSAY OF FREE THYROXINE: CPT

## 2024-07-18 PROCEDURE — 84443 ASSAY THYROID STIM HORMONE: CPT

## 2024-07-18 PROCEDURE — 83036 HEMOGLOBIN GLYCOSYLATED A1C: CPT

## 2024-07-18 PROCEDURE — 80061 LIPID PANEL: CPT

## 2024-07-18 PROCEDURE — 85025 COMPLETE CBC W/AUTO DIFF WBC: CPT

## 2024-07-18 PROCEDURE — 80053 COMPREHEN METABOLIC PANEL: CPT

## 2024-07-18 PROCEDURE — 36415 COLL VENOUS BLD VENIPUNCTURE: CPT

## 2024-07-18 PROCEDURE — 84481 FREE ASSAY (FT-3): CPT

## 2024-07-21 ENCOUNTER — PATIENT MESSAGE (OUTPATIENT)
Dept: PRIMARY CARE | Facility: CLINIC | Age: 39
End: 2024-07-21
Payer: COMMERCIAL

## 2024-07-21 DIAGNOSIS — E03.9 ACQUIRED HYPOTHYROIDISM: Primary | ICD-10-CM

## 2024-07-31 DIAGNOSIS — E10.65 TYPE 1 DIABETES MELLITUS WITH HYPERGLYCEMIA (MULTI): Primary | ICD-10-CM

## 2024-08-12 ENCOUNTER — APPOINTMENT (OUTPATIENT)
Dept: PHARMACY | Facility: HOSPITAL | Age: 39
End: 2024-08-12
Payer: COMMERCIAL

## 2024-08-12 DIAGNOSIS — E10.65 TYPE 1 DIABETES MELLITUS WITH HYPERGLYCEMIA (MULTI): ICD-10-CM

## 2024-08-12 NOTE — PROGRESS NOTES
[facility] (ID)    Erica Carcamo is a 38 y.o. female referred to Clinical Pharmacy Team to complete a comprehensive medication review (CMR) with a pharmacist as part of the [employer] ([employer]) diabetes program.  Pharmacy team may also provide assistance in diabetes management per discussion with referring provider and/or endocrinology. Referring Provider: Tom Oliva MD    Does patient follow with Endocrinology: Yes  Endocrinology Provider Name:  Tom Oliva MD    Subjective   Allergies   Allergen Reactions    Penicillin V Rash    Gluten Unknown    Penicillamine Unknown        [facility]  [address] [place] [address], [facility] [address]  [place] [address]  Phone: [phone] Fax: [phone]      Diabetes  She presents for her follow-up diabetic visit. She has type 1 diabetes mellitus. Risk factors for coronary artery disease include diabetes mellitus and dyslipidemia. Current diabetic treatment includes insulin pump.     HOME MONITORING  Monitoring Device: CGM, Dexcom G6  Monitoring Frequency: continuous  Pt endorses having back up testing method (fingerstick glucometer) in case of CGM failure, endorses having all testing supplies    Current home BG readings: Reported from memory - AM FBG ~ mg/dL, highest seen ~150 mg/dL  Any episodes of hypoglycemia? Yes, reports rare hypoglycemic readings in AM after fasting, lowest witnessed 54 mg/dL .  Did patient treat episode of hypoglycemia appropriately? Yes      Objective     There were no vitals taken for this visit.     LAB  Lab Results   Component Value Date    BILITOT 0.7 07/18/2024    CALCIUM 9.4 07/18/2024    CO2 29 07/18/2024    CL 97 (L) 07/18/2024    CREATININE 0.70 07/18/2024    GLUCOSE 257 (H) 07/18/2024    ALKPHOS 135 (H) 07/18/2024    K 4.1 07/18/2024    PROT 7.1 07/18/2024     07/18/2024    AST 28 07/18/2024    ALT 18 07/18/2024    BUN 7 07/18/2024    ANIONGAP 14 07/18/2024    MG 1.9  "03/30/2023    PHOS 4.3 03/30/2023    ALBUMIN 4.3 07/18/2024    LIPASE 16 03/26/2023     Lab Results   Component Value Date    TRIG 64 07/18/2024    CHOL 217 (H) 07/18/2024    LDLCALC 102 (H) 07/18/2024    .3 07/18/2024     Lab Results   Component Value Date    HGBA1C 7.8 (H) 07/18/2024     Estimated body mass index is 24.28 kg/m² as calculated from the following:    Height as of 4/18/24: 1.702 m (5' 7\").    Weight as of 4/18/24: 70.3 kg (155 lb).     Current Outpatient Medications on File Prior to Visit   Medication Sig Dispense Refill    buPROPion XL (Wellbutrin XL) 300 mg 24 hr tablet Take 1 tablet (300 mg) by mouth once daily in the morning. Do not crush, chew, or split. 90 tablet 1    Dexcom G6  misc       Dexcom G6 Sensor device For continuous glucose monitoring.  Change every 10 days. 9 each 3    Dexcom G6 Transmitter device For continuous glucose monitoring.  Change every 90 days. 1 each 3    insulin aspart (NovoLOG) 100 unit/mL injection Use via pump      insulin pump cart,automated,BT (Omnipod 5 G6 Pods, Gen 5,) cartridge Use as directed. Replace every 3 days. 30 each 3    LORazepam (Ativan) 0.5 mg tablet Take 0.5-1 tablet by mouth once a day as needed for severe anxiety. Do not take and drive. 3 tablet 0    semaglutide 0.25 mg or 0.5 mg (2 mg/3 mL) pen injector INJECT 0.25 MG UNDER THE SKIN ONCE WEEKLY THEN INCREASE TO 0.5MG UNDER THE SKIN ONCE WEEKLY (Patient taking differently: Inject 0.5 mg under the skin 1 (one) time per week.) 3 mL 1    sertraline (Zoloft) 100 mg tablet Take 1.5 tablets (150 mg) by mouth once daily. 135 tablet 3    thyroid, pork, (NP Thyroid) 90 mg tablet TAKE 1 TABLET BY MOUTH ON AN EMPTY STOMACH AS DIRECTED 90 tablet 1     No current facility-administered medications on file prior to visit.        MEDICATION RECONCILIATION  Added: none  Changed:   Ozempic 0.5 mg injected subcutaneously once weekly  Removed: None    Drug Interactions:  Drug - Disease: Ozempic - T1DM; " GLP-1RAs are not indicated for use in patients with T1DM for glycemic control. Initiated by PCP, discussed by endo.    CURRENT DIABETES PHARMACOTHERAPY  Insulin aspart via insulin pump (Omnipod)  Ozempic 0.5 mg injected subcutaneously once weekly    SECONDARY PREVENTION  Statin? no  LDL: not at goal, labs up to date  ACE-I/ARB? no  BP: at goal, < 130/80  UACR:  unable to assess, no UACR result  Aspirin?  no    Assessment/Plan   Problem List Items Addressed This Visit       Type 1 diabetes mellitus with hyperglycemia (Multi)        Diabetes:  Patient's Type 1 diabetes is improved with most recent A1c of 7.8% on 7/18/24, decreased from 8.5% 1/31/24 (Goal < 7%).   Patient is prescribed no secondary preventative therapy.   Future considerations: moderate intensity statin,   Concerns with DM regimen:  GLP-1RA in T1DM  - Not FDA approved; however, may show some benefit. Patient reports improved glycemic control since starting Ozempic. Managed by PCP/Endo, will defer decision making to these providers.    Comprehensive Medication Review:  Reviewed all medications on patient medication list in EMR. Discussed indication, administration, MOA and possible side effects to medications. Answered all patient questions and concerns.   Patient denies side effects with medications.   Adherence is expected to be Good.   Concerns with medication list: none    [employer] Employee Diabetes Program Enrollment: Renewal  Patient enrolled in  Employee diabetes program for $0 co-pays on diabetes medications/supplies. Renewal enrollment should be active in 2-4 weeks.  Requested VB[employer] enrollment date: 8/12/24  PharmD Management Level: Level 1   [facility]      Follow up: 10-12 months for renewal      Deion DohertyD     Continue all meds under the continuation of care with the referring provider and clinical pharmacy team. Patient/Caregiver was informed they may decline to participate or withdraw from  participation in pharmacy services at any time.

## 2024-08-13 NOTE — PROGRESS NOTES
I reviewed the progress note and agree with the resident’s findings and plans as written. Case discussed with resident.    Oswald Jefferson, PharmD

## 2024-08-27 ENCOUNTER — PHARMACY VISIT (OUTPATIENT)
Dept: PHARMACY | Facility: CLINIC | Age: 39
End: 2024-08-27
Payer: COMMERCIAL

## 2024-08-27 DIAGNOSIS — E10.65 TYPE 1 DIABETES MELLITUS WITH HYPERGLYCEMIA (MULTI): Primary | ICD-10-CM

## 2024-08-27 PROCEDURE — RXMED WILLOW AMBULATORY MEDICATION CHARGE

## 2024-08-27 RX ORDER — INSULIN LISPRO 100 [IU]/ML
INJECTION, SOLUTION INTRAVENOUS; SUBCUTANEOUS
Qty: 90 ML | Refills: 0 | Status: SHIPPED | OUTPATIENT
Start: 2024-08-27

## 2024-09-03 PROCEDURE — RXMED WILLOW AMBULATORY MEDICATION CHARGE

## 2024-09-09 ENCOUNTER — PHARMACY VISIT (OUTPATIENT)
Dept: PHARMACY | Facility: CLINIC | Age: 39
End: 2024-09-09
Payer: COMMERCIAL

## 2024-09-11 PROCEDURE — RXMED WILLOW AMBULATORY MEDICATION CHARGE

## 2024-09-13 ENCOUNTER — PHARMACY VISIT (OUTPATIENT)
Dept: PHARMACY | Facility: CLINIC | Age: 39
End: 2024-09-13
Payer: COMMERCIAL

## 2024-09-24 PROCEDURE — RXMED WILLOW AMBULATORY MEDICATION CHARGE

## 2024-09-30 ENCOUNTER — PHARMACY VISIT (OUTPATIENT)
Dept: PHARMACY | Facility: CLINIC | Age: 39
End: 2024-09-30
Payer: COMMERCIAL

## 2024-09-30 PROCEDURE — RXMED WILLOW AMBULATORY MEDICATION CHARGE

## 2024-10-15 ENCOUNTER — PATIENT MESSAGE (OUTPATIENT)
Dept: CARE COORDINATION | Facility: CLINIC | Age: 39
End: 2024-10-15
Payer: COMMERCIAL

## 2024-10-28 ENCOUNTER — APPOINTMENT (OUTPATIENT)
Dept: ENDOCRINOLOGY | Facility: CLINIC | Age: 39
End: 2024-10-28
Payer: COMMERCIAL

## 2024-11-29 ENCOUNTER — OFFICE VISIT (OUTPATIENT)
Dept: URGENT CARE | Age: 39
End: 2024-11-29
Payer: COMMERCIAL

## 2024-11-29 ENCOUNTER — ANCILLARY PROCEDURE (OUTPATIENT)
Dept: URGENT CARE | Age: 39
End: 2024-11-29
Payer: COMMERCIAL

## 2024-11-29 ENCOUNTER — PHARMACY VISIT (OUTPATIENT)
Dept: PHARMACY | Facility: CLINIC | Age: 39
End: 2024-11-29
Payer: COMMERCIAL

## 2024-11-29 VITALS
BODY MASS INDEX: 21.97 KG/M2 | HEIGHT: 67 IN | WEIGHT: 140 LBS | HEART RATE: 90 BPM | RESPIRATION RATE: 18 BRPM | DIASTOLIC BLOOD PRESSURE: 92 MMHG | TEMPERATURE: 98 F | SYSTOLIC BLOOD PRESSURE: 123 MMHG | OXYGEN SATURATION: 97 %

## 2024-11-29 DIAGNOSIS — S69.91XA RIGHT WRIST INJURY, INITIAL ENCOUNTER: ICD-10-CM

## 2024-11-29 DIAGNOSIS — S69.91XA RIGHT WRIST INJURY, INITIAL ENCOUNTER: Primary | ICD-10-CM

## 2024-11-29 DIAGNOSIS — S61.451A DOG BITE OF RIGHT HAND, INITIAL ENCOUNTER: ICD-10-CM
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
## 2024-11-29 PROCEDURE — 73110 X-RAY EXAM OF WRIST: CPT | Mod: RIGHT SIDE | Performed by: SURGERY

## 2024-11-29 PROCEDURE — RXMED WILLOW AMBULATORY MEDICATION CHARGE

## 2024-11-29 RX ORDER — SULFAMETHOXAZOLE AND TRIMETHOPRIM 800; 160 MG/1; MG/1
1 TABLET ORAL 2 TIMES DAILY
Qty: 14 TABLET | Refills: 0 | Status: SHIPPED | OUTPATIENT
Start: 2024-11-29 | End: 2024-12-06

## 2024-11-29 NOTE — PROGRESS NOTES
Chief Complaint   Patient presents with    Animal Bite     Dog bite today       Physical Exam Right hand and wrist:     Skin: single puncture wound on the dorsum and palm of the right hand   Swelling: dorsum of hand  Deformity: None  Tenderness: radial aspect of wrist/hand  ROM: limited in the first digit ecidary to pain  Joint effusion: None    Imaging:       === 11/29/24 ===    XR WRIST 3+ VIEWS RIGHT    - Impression -  Nonspecific dorsal soft tissue swelling of the level of the  metacarpals with with small dorsal skin wound.    No fracture or radiopaque foreign body.    Signed by: Marquez Luther 11/29/2024 8:38 AM  Dictation workstation:   [id number]    Assessment:     Encounter Diagnoses   Name Primary?    Right wrist injury, initial encounter Yes    Dog bite of right hand, initial encounter           Medical Decision Making & Plan:   Bactrim  WOUnd care   Tdap utd           11/29/24 at 12:56 PM - Loida Suresh DO

## 2024-12-02 DIAGNOSIS — E03.9 ACQUIRED HYPOTHYROIDISM: ICD-10-CM

## 2024-12-02 PROCEDURE — RXMED WILLOW AMBULATORY MEDICATION CHARGE

## 2024-12-02 RX ORDER — THYROID 90 MG/1
TABLET ORAL
Qty: 90 TABLET | Refills: 0 | Status: SHIPPED | OUTPATIENT
Start: 2024-12-02 | End: 2025-12-02

## 2024-12-04 ENCOUNTER — OFFICE VISIT (OUTPATIENT)
Dept: SPORTS MEDICINE | Facility: CLINIC | Age: 39
End: 2024-12-04
Payer: COMMERCIAL

## 2024-12-04 ENCOUNTER — HOSPITAL ENCOUNTER (OUTPATIENT)
Dept: RADIOLOGY | Facility: CLINIC | Age: 39
Discharge: HOME | End: 2024-12-04
Payer: COMMERCIAL

## 2024-12-04 VITALS
SYSTOLIC BLOOD PRESSURE: 118 MMHG | DIASTOLIC BLOOD PRESSURE: 78 MMHG | BODY MASS INDEX: 21.97 KG/M2 | HEIGHT: 67 IN | WEIGHT: 140 LBS | HEART RATE: 74 BPM

## 2024-12-04 DIAGNOSIS — M65.4 DE QUERVAIN'S TENOSYNOVITIS, RIGHT: Primary | ICD-10-CM

## 2024-12-04 DIAGNOSIS — M77.8 TENDINITIS OF FLEXOR TENDON OF RIGHT HAND: ICD-10-CM

## 2024-12-04 DIAGNOSIS — M77.8 EXTENSOR TENDINITIS OF HAND: ICD-10-CM

## 2024-12-04 DIAGNOSIS — M25.531 RIGHT WRIST PAIN: ICD-10-CM

## 2024-12-04 DIAGNOSIS — S61.451A DOG BITE OF HAND WITHOUT COMPLICATION, RIGHT, INITIAL ENCOUNTER: ICD-10-CM
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
## 2024-12-04 DIAGNOSIS — S69.91XA RIGHT WRIST INJURY, INITIAL ENCOUNTER: ICD-10-CM

## 2024-12-04 DIAGNOSIS — S63.501A RIGHT WRIST SPRAIN, INITIAL ENCOUNTER: ICD-10-CM

## 2024-12-04 PROBLEM — S60.221A CONTUSION OF RIGHT HAND: Status: ACTIVE | Noted: 2024-12-04

## 2024-12-04 PROCEDURE — RXMED WILLOW AMBULATORY MEDICATION CHARGE

## 2024-12-04 PROCEDURE — 3051F HG A1C>EQUAL 7.0%<8.0%: CPT | Performed by: NURSE PRACTITIONER

## 2024-12-04 PROCEDURE — 3078F DIAST BP <80 MM HG: CPT | Performed by: NURSE PRACTITIONER

## 2024-12-04 PROCEDURE — 99214 OFFICE O/P EST MOD 30 MIN: CPT | Performed by: NURSE PRACTITIONER

## 2024-12-04 PROCEDURE — 73110 X-RAY EXAM OF WRIST: CPT | Mod: RT

## 2024-12-04 PROCEDURE — 3074F SYST BP LT 130 MM HG: CPT | Performed by: NURSE PRACTITIONER

## 2024-12-04 PROCEDURE — 3008F BODY MASS INDEX DOCD: CPT | Performed by: NURSE PRACTITIONER

## 2024-12-04 PROCEDURE — 3049F LDL-C 100-129 MG/DL: CPT | Performed by: NURSE PRACTITIONER

## 2024-12-04 PROCEDURE — 73110 X-RAY EXAM OF WRIST: CPT | Mod: RIGHT SIDE | Performed by: RADIOLOGY

## 2024-12-04 RX ORDER — SULFAMETHOXAZOLE AND TRIMETHOPRIM 800; 160 MG/1; MG/1
1 TABLET ORAL 2 TIMES DAILY
Qty: 6 TABLET | Refills: 0 | Status: SHIPPED | OUTPATIENT
Start: 2024-12-04 | End: 2024-12-09

## 2024-12-04 ASSESSMENT — ENCOUNTER SYMPTOMS
CARDIOVASCULAR NEGATIVE: 1
MYALGIAS: 1
DEPRESSION: 0
OCCASIONAL FEELINGS OF UNSTEADINESS: 0
RESPIRATORY NEGATIVE: 1
ARTHRALGIAS: 1
CONSTITUTIONAL NEGATIVE: 1
LOSS OF SENSATION IN FEET: 0

## 2024-12-04 ASSESSMENT — PAIN SCALES - GENERAL: PAINLEVEL_OUTOF10: 5

## 2024-12-04 NOTE — PATIENT INSTRUCTIONS
May use RICE therapy as needed  Start into hand/physical therapy 1-2 times a week for 8-10 weeks with manual therapy as well as dry needling and IASTM  Additionally reviewed modifying activities to be performed while exercising as well as activities with daily living  Discussed in detail with the patient to the level of their understanding the possibility in the future of regenerative injections versus corticosteroid injections  Recommendation over-the-counter calcium 500mg, 3 times a day with vitamin-D3 7064-8199+ units a day with food as well as a daily multivitamin  Recommendation over-the-counter Move Free for joint health  May take OTC Tylenol Extra Strength or OTC Tylenol Arthritis, taking one every 6-8 hours with food as needed for pain management.  Patient advised regarding the risks and/or potential adverse reactions and/or side effects of any prescribed medications along with any over-the-counter medications or any supplements used. Patient advised to seek immediate medical care if any adverse reactions occur. The patient and/or patient(s) parent(s) verbalized their understanding.  Possibility in future of MRI of RIGHT HAND/WRIST to rule out tendon vs ligament vs tear vs fracture vs other, MSK to read.  Patient was given a LONG THUMB brace for their RIGHT HAND/WRIST injury/condition. The patient is ambulatory with or without aid and feels more stable with the brace on. The brace definitely helps improve their function as well as stability. Verbal and written instructions for the use, wear schedule, cleaning and application of this brace were given. Patient was instructed that should the brace result in increased pain, decreased sensation, numbness and/or tingling, increased swelling, or an overall worsening of their medical condition; to please contact our office immediately. Orthotic/brace management and training was provided for skin care, modifications due to healing tissues, edema changes, interruption  in skin integrity and safety precautions with the Orthosis/brace.   Follow up as needed

## 2024-12-04 NOTE — PROGRESS NOTES
Established patient  History Of Present Illness  Erica Carcamo is a 39 y.o. female presenting with RIGHT wrist pain. On Friday, states that her two larger dogs were fighting when she got in the middle. Notes that she did get bit, not deep enough but enough to injure her wrist and in to her hand. Pain in the radial aspect of the wrist in to the thumb and going up in to the first MTP. Pain with all range of motion and limited. Pt went to [facility] that night for wound care and xrays. Notes some tingling sensation in her hand. Swelling present. Rates current pain as a 5/10 without touch and a 9/10.  We reviewed patient x-ray results in detail.  Patient and spouse verbalized understanding of results.  We discussed treatment options and patient appears to have a wrist sprain/strain as well as tendinitis with some continued inflammation and cellulitis related to the dog bite.  Patient has been on antibiotics and compliant and tolerating the antibiotics well after discussion we will provide an extension to her current antibiotic dose to help ensure resolution of any underlying infection.  Patient can start into occupational therapy as soon as she is able and we can reevaluate in a couple of weeks if she continues to have significant pain or disability with consideration of an MRI or CT is indicated.  Patient and spouse verbalized understanding and agreement with plan of care.    Past Medical History  She has a past medical history of Anxiety, Diabetes mellitus (Multi), Disease of thyroid gland, Headache, and Urinary tract infection.    Surgical History  She has a past surgical history that includes Other surgical history (2022); Other surgical history (2022);  section, low transverse; and Tubal ligation.     Social History  She reports that she has never smoked. She has never been exposed to tobacco smoke. She has never used smokeless tobacco. She reports current alcohol use. She reports that  "she does not use drugs.    Family History  Family History   Problem Relation Name Age of Onset    Hyperlipidemia Mother Savanah Lyons         borderline    Depression Mother Savanah Lyons     Hypertension Father Ismael Lyons         borderline    Hyperlipidemia Father Ismael Lyons     Alcohol abuse Father Ismael Lyons     Arthritis Father Ismael Lyons     Depression Father Ismael Lyons     Stroke Father Ismael Lyons      Allergies  Penicillin v, Gluten, and Penicillamine    Review of Systems  Review of Systems   Constitutional: Negative.    Respiratory: Negative.     Cardiovascular: Negative.    Musculoskeletal:  Positive for arthralgias and myalgias.   All other systems reviewed and are negative.    Last Recorded Vitals  /78 (BP Location: Left arm, Patient Position: Sitting, BP Cuff Size: Adult)   Pulse 74   Ht 1.702 m (5' 7\")   Wt 63.5 kg (140 lb)   LMP 11/07/2024 (Exact Date)   BMI 21.93 kg/m²      The , KARINE ROSE was present during today's visit and not limited to physical examination!    Examination:  Right Radial  Erythema: Positive   Edema: Positive   Effusion: Negative.   Warmth: Negative.   Ecchymosis/Bruising: Positive   Percussion Test: Positive   Tuning Fork Test: Negative.   Abrasions: Negative.   Orientation: Positive Asymmetrical due to swelling           ROM:   Positive  FROM, Painful    Wrist Flexion (80 degrees) Painful    Wrist Extension (70 degrees) Painful    Wrist Supination (90 degrees)  Wrist Pronation (90 degrees)         Wrist Ulnar Deviation (30 degrees)  Wrist Radial Deviation (20 degrees)          Finger MCP (100 degrees)/ PIP (100 degrees)/ DIP (90 degrees) Flexion  Finger MCP (30 degrees) /PIP (0 degrees) / DIP (20 degrees) Extension          Fingers ABduction (20 degrees)  Fingers ADduction (0 degrees)          Thumb & Fingertip Opposition Painful    Thumb Circumduction. Painful             Muscle Strength: Positive   +4/+5 Wrist " Flexion  +3/+5 Wrist Extension        +4/+5 Wrist Supination  +4/+5 Wrist Pronation          +4/+5 Wrist Ulnar Deviation  +4/+5 Wrist Radial Deviation         +3/+5 Finger MCP/PIP/DIP Flexion  +4/+5 Finger MCP/PIP/DIP Extension          +4/+5 Finger Abduction  +4/+5 Finger Adduction          +4/+5 Finger/Thumb Opposition  +4/+5 Thumb Circumduction.            Motor/Neurological:    Normal  Tip of Thumb (Median Nerve)  Tip of 5th Finger (Ulnar Nerve)  Flex and Extend Thumb (Median and Radial Nerve)  Scissor Fingers (Ulnar Nerve)  Raise Thumb Against Resistance on Flat Surface (Median Nerve).          Sensation/Neurological:   Negative, Sensation Intact, 2 Point Discrimination Test Negative         C6: Lateral forearms, thumbs, anterior index finger, lateral half of the middle finger  C7: Some of posterior index finger, medial half of middle finger, upper posterior back, back of arms  C8: Ring finger, little finger, medial forearm, posterior upper back.     Palpation:   Positive Tenderness to Palpation Right Radial aspect of thumb, scaphoid       Vascular:   +2/+4 Radial Pulse  +2/+4 Ulnar Pulse  Capillary Refill < 2 seconds.               Wrist/Hand/Finger - Motor Function:  [place]-Littler Test: Negative.   Retinacular Test: Negative.   Princh  Test: Negative.   Key  Test: Negative.   Power  Test: Negative.   Dane  Test: Negative.         Wrist/Hand/Finger - Nerve Lesions/Compression Neuropathies:  Carpal Tunnel Sign Test: Negative.   Phalen's Sign Test: Negative.   Reverse Phalen's Sign Test: Negative.   Wartenberg Sign Test: Negative.   Tinel's Sign Test: Negative.   Opposition Test: Negative.   Pinch Test: Negative.   Carpal/Digital Compression Test: Negative.   Ochsner Test: Negative.   Froment's Sign Ulnar Nerve Test: Negative.   Intrinsic Ulnar Nerve Test: Negative.   O Test: Negative.         Wrist/Hand/Finger - Stability:  Valgus Stess Test: Negative.   Varus Stess Test: Negative.   Chidi  Scaphoid Shift Test: Negative.   Scapholunate Ballotment Test: Negative.   Kenneth Test: Negative.   Dorsal Capitate Displacement Apprehension Test: Negative.   Shuck Test: Negative.         Wrist/Hand/Finger - Tenosynovitis:  Flexor Digitorum Profundus Test:  Negative.   Flexor Digitorum Superficialis Test:  Negative.   Flexor Pollicis Longus Test:  Negative.   Flexor Pollicis Brevis Test:  Negative.   Flexor Radial Longus Test:  Negative.   Flexor Radial Brevis Test:  Negative.   Extensor Digitorum Profundus Test:  Negative.   Extensor Digitorum Superficialis Test:  Negative.   Extensor Pollicis Longus Test:  Negative.   Extensor Pollicis Brevis (EPB)[Dequervain's):  Negative.   Extensor Radial Longus Test:  Negative.   Extensor Radial Brevis Test:  Negative.   Muckard Test:  Negative.   Finklestein Test:  Negative.   Kanavel Sign:  Negative.   Flexor and Extensor Longus Test:  Negative.         Wrist/Hand/Finger - TFCC:  Supination Lift Test: Negative.   Grind Test: Negative.          Imaging and Diagnostics Review:  Plain radiographs ordered and independently reviewed.    Assessment   1. Right wrist injury, initial encounter    2. Right wrist pain    3. De Quervain's tenosynovitis, right    4. Right wrist sprain, initial encounter    5. Contusion of right hand, initial encounter        Plan   Treatment or Intervention:  May use RICE therapy as needed  Start into hand/physical therapy 1-2 times a week for 8-10 weeks with manual therapy as well as dry needling and IASTM  Additionally reviewed modifying activities to be performed while exercising as well as activities with daily living  Discussed in detail with the patient to the level of their understanding the possibility in the future of regenerative injections versus corticosteroid injections  Recommendation over-the-counter calcium 500mg, 3 times a day with vitamin-D3 7004-9571+ units a day with food as well as a daily multivitamin  Recommendation  over-the-counter Move Free for joint health  May take OTC Tylenol Extra Strength or OTC Tylenol Arthritis, taking one every 6-8 hours with food as needed for pain management.  Patient advised regarding the risks and/or potential adverse reactions and/or side effects of any prescribed medications along with any over-the-counter medications or any supplements used. Patient advised to seek immediate medical care if any adverse reactions occur. The patient and/or patient(s) parent(s) verbalized their understanding.  Possibility in future of MRI of RIGHT HAND/WRIST to rule out tendon vs ligament vs tear vs fracture vs other, MSK to read.  Patient was given a LONG THUMB brace for their RIGHT HAND/WRIST injury/condition. The patient is ambulatory with or without aid and feels more stable with the brace on. The brace definitely helps improve their function as well as stability. Verbal and written instructions for the use, wear schedule, cleaning and application of this brace were given. Patient was instructed that should the brace result in increased pain, decreased sensation, numbness and/or tingling, increased swelling, or an overall worsening of their medical condition; to please contact our office immediately. Orthotic/brace management and training was provided for skin care, modifications due to healing tissues, edema changes, interruption in skin integrity and safety precautions with the Orthosis/brace.     Diagnostic studies:    XR wrist right 3+ views  Narrative: Interpreted By:  Marquez Luther,   STUDY:  XR WRIST RIGHT 3+ VIEWS      INDICATION:  Signs/Symptoms:dog bite.      COMPARISON:  None      ACCESSION NUMBER(S):  [id number]      ORDERING CLINICIAN:  KARTIK HOWARD      FINDINGS:  Nonspecific dorsal soft tissue swelling of the level of the  metacarpals with with small dorsal skin wound.      No fracture or radiopaque foreign body.      Impression:     Nonspecific dorsal soft tissue swelling of the level of  the  metacarpals with with small dorsal skin wound.      No fracture or radiopaque foreign body.      Signed by: Marquez Luther 11/29/2024 8:38 AM  Dictation workstation:   [id number]       Activity Instructions, Restrictions, and Accommodations:      Consultations/Referrals:  Occupational therapy    Follow-up:  Follow up as needed  Total appointment time _30_ minutes. Greater than 50% spent counseling patient on results of physical exam, treatment options as well as results of ordered imaging and treatment for results, need for PT and expected outcomes, as well as discussing possible medications.    Jose Linares CNP

## 2024-12-05 PROBLEM — S61.451A DOG BITE OF HAND WITHOUT COMPLICATION, RIGHT, INITIAL ENCOUNTER: Status: ACTIVE | Noted: 2024-12-05
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
## 2024-12-06 ENCOUNTER — PHARMACY VISIT (OUTPATIENT)
Dept: PHARMACY | Facility: CLINIC | Age: 39
End: 2024-12-06
Payer: COMMERCIAL

## 2024-12-23 DIAGNOSIS — F41.9 ANXIETY: ICD-10-CM

## 2024-12-23 RX ORDER — BUPROPION HYDROCHLORIDE 300 MG/1
300 TABLET ORAL EVERY MORNING
Qty: 90 TABLET | Refills: 1 | Status: SHIPPED | OUTPATIENT
Start: 2024-12-23 | End: 2025-06-21

## 2024-12-26 PROCEDURE — RXMED WILLOW AMBULATORY MEDICATION CHARGE

## 2024-12-31 ENCOUNTER — PHARMACY VISIT (OUTPATIENT)
Dept: PHARMACY | Facility: CLINIC | Age: 39
End: 2024-12-31
Payer: COMMERCIAL

## 2025-01-06 DIAGNOSIS — F41.9 ANXIETY: ICD-10-CM
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
## 2025-01-06 DIAGNOSIS — M77.8 TENDINITIS OF EXTENSOR TENDON OF RIGHT HAND: ICD-10-CM

## 2025-01-06 DIAGNOSIS — S61.451A DOG BITE OF HAND WITHOUT COMPLICATION, RIGHT, INITIAL ENCOUNTER: Primary | ICD-10-CM

## 2025-01-06 RX ORDER — SERTRALINE HYDROCHLORIDE 100 MG/1
150 TABLET, FILM COATED ORAL DAILY
Qty: 135 TABLET | Refills: 1 | Status: SHIPPED | OUTPATIENT
Start: 2025-01-06 | End: 2026-01-06

## 2025-01-06 NOTE — PROGRESS NOTES
Patient called and has been having continued pain and restriction of the right hand and wrist despite completing antibiotics and conservative therapy.  Will order MRI to evaluate for soft tissue injury or continued infection.

## 2025-01-20 ENCOUNTER — APPOINTMENT (OUTPATIENT)
Dept: ENDOCRINOLOGY | Facility: CLINIC | Age: 40
End: 2025-01-20
Payer: COMMERCIAL

## 2025-01-23 ENCOUNTER — APPOINTMENT (OUTPATIENT)
Dept: RADIOLOGY | Facility: CLINIC | Age: 40
End: 2025-01-23
Payer: COMMERCIAL

## 2025-01-27 ENCOUNTER — PHARMACY VISIT (OUTPATIENT)
Dept: PHARMACY | Facility: CLINIC | Age: 40
End: 2025-01-27
Payer: COMMERCIAL

## 2025-01-27 PROCEDURE — RXMED WILLOW AMBULATORY MEDICATION CHARGE

## 2025-01-29 DIAGNOSIS — E10.65 TYPE 1 DIABETES MELLITUS WITH HYPERGLYCEMIA (MULTI): ICD-10-CM

## 2025-01-29 DIAGNOSIS — E10.9 TYPE 1 DIABETES MELLITUS WITHOUT COMPLICATION: ICD-10-CM

## 2025-01-29 PROCEDURE — RXMED WILLOW AMBULATORY MEDICATION CHARGE

## 2025-01-29 RX ORDER — BLOOD-GLUCOSE SENSOR
EACH MISCELLANEOUS
Qty: 9 EACH | Refills: 3 | Status: SHIPPED | OUTPATIENT
Start: 2025-01-29

## 2025-01-29 RX ORDER — BLOOD-GLUCOSE TRANSMITTER
EACH MISCELLANEOUS
Qty: 1 EACH | Refills: 3 | Status: SHIPPED | OUTPATIENT
Start: 2025-01-29

## 2025-01-29 RX ORDER — INSULIN LISPRO 100 [IU]/ML
INJECTION, SOLUTION INTRAVENOUS; SUBCUTANEOUS
Qty: 90 ML | Refills: 3 | Status: SHIPPED | OUTPATIENT
Start: 2025-01-29

## 2025-01-31 ENCOUNTER — PHARMACY VISIT (OUTPATIENT)
Dept: PHARMACY | Facility: CLINIC | Age: 40
End: 2025-01-31
Payer: COMMERCIAL

## 2025-01-31 ENCOUNTER — HOSPITAL ENCOUNTER (OUTPATIENT)
Dept: RADIOLOGY | Facility: CLINIC | Age: 40
Discharge: HOME | End: 2025-01-31
Payer: COMMERCIAL

## 2025-01-31 DIAGNOSIS — Z12.31 SCREENING MAMMOGRAM FOR BREAST CANCER: ICD-10-CM

## 2025-01-31 PROCEDURE — 77067 SCR MAMMO BI INCL CAD: CPT

## 2025-02-03 DIAGNOSIS — Z12.31 ENCOUNTER FOR SCREENING MAMMOGRAM FOR BREAST CANCER: Primary | ICD-10-CM

## 2025-02-03 DIAGNOSIS — E10.9 TYPE 1 DIABETES MELLITUS WITHOUT COMPLICATION: ICD-10-CM

## 2025-02-03 RX ORDER — INSULIN PMP CART,AUT,G6/7,CNTR
1 EACH SUBCUTANEOUS
Qty: 30 EACH | Refills: 3 | Status: CANCELLED | OUTPATIENT
Start: 2025-02-03 | End: 2026-02-03

## 2025-02-05 DIAGNOSIS — E10.9 TYPE 1 DIABETES MELLITUS WITHOUT COMPLICATION: Primary | ICD-10-CM

## 2025-02-05 PROCEDURE — RXMED WILLOW AMBULATORY MEDICATION CHARGE

## 2025-02-05 RX ORDER — INSULIN PMP CART,AUT,G6/7,CNTR
1 EACH SUBCUTANEOUS
Qty: 30 EACH | Refills: 0 | Status: SHIPPED | OUTPATIENT
Start: 2025-02-05 | End: 2025-05-07

## 2025-02-06 ENCOUNTER — HOSPITAL ENCOUNTER (OUTPATIENT)
Dept: RADIOLOGY | Facility: CLINIC | Age: 40
Discharge: HOME | End: 2025-02-06
Payer: COMMERCIAL

## 2025-02-06 ENCOUNTER — PHARMACY VISIT (OUTPATIENT)
Dept: PHARMACY | Facility: CLINIC | Age: 40
End: 2025-02-06
Payer: COMMERCIAL

## 2025-02-06 DIAGNOSIS — M77.8 TENDINITIS OF EXTENSOR TENDON OF RIGHT HAND: ICD-10-CM
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
## 2025-02-06 DIAGNOSIS — S61.451A DOG BITE OF HAND WITHOUT COMPLICATION, RIGHT, INITIAL ENCOUNTER: ICD-10-CM

## 2025-02-06 PROCEDURE — 73218 MRI UPPER EXTREMITY W/O DYE: CPT | Mod: RT

## 2025-02-23 NOTE — PROGRESS NOTES
Subjective   Patient ID: Erica Carcamo is a 39 y.o. female who presents for Annual Exam.     Medications reviewed:          Current medications Use reviewed and recorded. Vitamin/Mineral supplements Use reviewed and recorded.      Review family history:          Reviewed See family history.      Review past history:          Reviewed See past history.      Review surgical history:          Reviewed See surgical history.    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          Mood at patient's baseline, stable.          Sleep disturbance none.          Energy change decreased.          Weight change decreased         Concentration improved         Suicidal ideations none.      Hypothyroidism:          Follow Up she is on [place] thyroid  -due for labs         Hypothyroidism tolerating meds with no side effects.      Diabetes Mellitus:          she sees endocrine for her diabetes - she has improving control of her sugars on ozempic, her insurance will not cover it for her.  Mammogram is UTD 1/25  Pap - 8/23 - repeat in 5 years         Review of Systems   Constitutional:  Positive for fatigue. Negative for chills and fever.   HENT:  Negative for ear pain and postnasal drip.    Respiratory:  Negative for cough, shortness of breath and wheezing.    Cardiovascular:  Negative for chest pain, palpitations and leg swelling.   Gastrointestinal:  Negative for constipation, nausea and vomiting.   Genitourinary:  Negative for frequency and hematuria.   Musculoskeletal:  Negative for arthralgias and myalgias.   Skin:  Negative for rash.   Neurological:  Negative for dizziness and weakness.   Psychiatric/Behavioral:  Negative for  "confusion. The patient is not nervous/anxious.    Vit D def, nocturia    Objective   /78   Pulse 76   Ht 1.702 m (5' 7\")   Wt 66.2 kg (146 lb)   SpO2 99%   BMI 22.87 kg/m²     Physical Exam  Exam conducted with a chaperone present.   Constitutional:       General: She is not in acute distress.     Appearance: Normal appearance. She is not ill-appearing.   HENT:      Head: Normocephalic.      Right Ear: Tympanic membrane and external ear normal.      Left Ear: Tympanic membrane and external ear normal.      Nose: Nose normal. No congestion.      Mouth/Throat:      Mouth: Mucous membranes are moist.      Pharynx: No posterior oropharyngeal erythema.   Eyes:      Extraocular Movements: Extraocular movements intact.      Conjunctiva/sclera: Conjunctivae normal.      Pupils: Pupils are equal, round, and reactive to light.   Cardiovascular:      Rate and Rhythm: Normal rate and regular rhythm.      Pulses: Normal pulses.      Heart sounds: Normal heart sounds. No murmur heard.  Pulmonary:      Effort: Pulmonary effort is normal. No respiratory distress.      Breath sounds: Normal breath sounds. No wheezing.   Chest:   Breasts:     Right: Normal. No mass, nipple discharge, skin change or tenderness.      Left: Normal. No mass, nipple discharge, skin change or tenderness.   Abdominal:      General: Bowel sounds are normal.      Palpations: Abdomen is soft. There is no mass.      Tenderness: There is no abdominal tenderness.      Hernia: No hernia is present.   Genitourinary:     Comments: declines  Musculoskeletal:         General: Normal range of motion.      Cervical back: Neck supple. No tenderness.      Right lower leg: No edema.      Left lower leg: No edema.   Lymphadenopathy:      Cervical: No cervical adenopathy.   Skin:     General: Skin is warm.      Findings: No rash.   Neurological:      General: No focal deficit present.      Mental Status: She is alert and oriented to person, place, and time.      " Gait: Gait normal.   Psychiatric:         Mood and Affect: Mood normal.         Behavior: Behavior normal.         Assessment/Plan   Problem List Items Addressed This Visit             ICD-10-CM    Anxiety F41.9    Relevant Medications    sertraline (Zoloft) 50 mg tablet    Hyperlipidemia E78.5    Relevant Orders    Comprehensive Metabolic Panel    Lipid Panel    Hypothyroidism E03.9    Relevant Orders    TSH with reflex to Free T4 if abnormal    T4, free    T3, free    Type 1 diabetes mellitus with hyperglycemia (Multi) E10.65    Relevant Orders    Albumin-Creatinine Ratio, Urine Random    Hemoglobin A1C    Vitamin D deficiency E55.9    Relevant Orders    Vitamin D 25-Hydroxy,Total (for eval of Vitamin D levels)     Other Visit Diagnoses         Codes    Routine general medical examination at a health care facility    -  Primary Z00.00    mammogram and pap are UTD     Relevant Orders    Urinalysis with Reflex Culture and Microscopic    Comprehensive Metabolic Panel    CBC and Auto Differential    TSH with reflex to Free T4 if abnormal    T4, free    Lipid Panel    Albumin-Creatinine Ratio, Urine Random    Hemoglobin A1C    Vitamin D 25-Hydroxy,Total (for eval of Vitamin D levels)    T3, free    Fatigue, unspecified type     R53.83    Relevant Orders    CBC and Auto Differential    Nocturia     R35.1    Relevant Orders    Urinalysis with Reflex Culture and Microscopic    Skin lesions     L98.9    referral to Dr Ramires

## 2025-02-24 ENCOUNTER — PHARMACY VISIT (OUTPATIENT)
Dept: PHARMACY | Facility: CLINIC | Age: 40
End: 2025-02-24
Payer: COMMERCIAL

## 2025-02-24 ENCOUNTER — OFFICE VISIT (OUTPATIENT)
Dept: PRIMARY CARE | Facility: CLINIC | Age: 40
End: 2025-02-24
Payer: COMMERCIAL

## 2025-02-24 VITALS
DIASTOLIC BLOOD PRESSURE: 78 MMHG | HEART RATE: 76 BPM | OXYGEN SATURATION: 99 % | SYSTOLIC BLOOD PRESSURE: 116 MMHG | WEIGHT: 146 LBS | HEIGHT: 67 IN | BODY MASS INDEX: 22.91 KG/M2

## 2025-02-24 DIAGNOSIS — Z00.00 ROUTINE GENERAL MEDICAL EXAMINATION AT A HEALTH CARE FACILITY: Primary | ICD-10-CM

## 2025-02-24 DIAGNOSIS — R53.83 FATIGUE, UNSPECIFIED TYPE: ICD-10-CM

## 2025-02-24 DIAGNOSIS — L98.9 SKIN LESIONS: ICD-10-CM

## 2025-02-24 DIAGNOSIS — E78.2 MIXED HYPERLIPIDEMIA: ICD-10-CM

## 2025-02-24 DIAGNOSIS — F41.9 ANXIETY: ICD-10-CM

## 2025-02-24 DIAGNOSIS — R35.1 NOCTURIA: ICD-10-CM

## 2025-02-24 DIAGNOSIS — E55.9 VITAMIN D DEFICIENCY: ICD-10-CM

## 2025-02-24 DIAGNOSIS — E10.65 TYPE 1 DIABETES MELLITUS WITH HYPERGLYCEMIA (MULTI): ICD-10-CM

## 2025-02-24 DIAGNOSIS — E03.9 ACQUIRED HYPOTHYROIDISM: ICD-10-CM

## 2025-02-24 PROCEDURE — 3008F BODY MASS INDEX DOCD: CPT | Performed by: FAMILY MEDICINE

## 2025-02-24 PROCEDURE — 99396 PREV VISIT EST AGE 40-64: CPT | Performed by: FAMILY MEDICINE

## 2025-02-24 PROCEDURE — RXMED WILLOW AMBULATORY MEDICATION CHARGE

## 2025-02-24 PROCEDURE — 1036F TOBACCO NON-USER: CPT | Performed by: FAMILY MEDICINE

## 2025-02-24 PROCEDURE — 3078F DIAST BP <80 MM HG: CPT | Performed by: FAMILY MEDICINE

## 2025-02-24 PROCEDURE — 3074F SYST BP LT 130 MM HG: CPT | Performed by: FAMILY MEDICINE

## 2025-02-24 RX ORDER — SERTRALINE HYDROCHLORIDE 50 MG/1
150 TABLET, FILM COATED ORAL DAILY
Qty: 270 TABLET | Refills: 3 | Status: SHIPPED | OUTPATIENT
Start: 2025-02-24 | End: 2026-02-24

## 2025-02-24 ASSESSMENT — ENCOUNTER SYMPTOMS
FEVER: 0
PALPITATIONS: 0
MYALGIAS: 0
HEMATURIA: 0
VOMITING: 0
COUGH: 0
WEAKNESS: 0
SHORTNESS OF BREATH: 0
CONSTIPATION: 0
DIZZINESS: 0
CHILLS: 0
OCCASIONAL FEELINGS OF UNSTEADINESS: 0
FATIGUE: 1
NAUSEA: 0
FREQUENCY: 0
WHEEZING: 0
ARTHRALGIAS: 0
CONFUSION: 0
NERVOUS/ANXIOUS: 0

## 2025-02-24 ASSESSMENT — PAIN SCALES - GENERAL: PAINLEVEL_OUTOF10: 0-NO PAIN

## 2025-02-24 ASSESSMENT — PATIENT HEALTH QUESTIONNAIRE - PHQ9
2. FEELING DOWN, DEPRESSED OR HOPELESS: NOT AT ALL
SUM OF ALL RESPONSES TO PHQ9 QUESTIONS 1 AND 2: 0
1. LITTLE INTEREST OR PLEASURE IN DOING THINGS: NOT AT ALL

## 2025-03-03 DIAGNOSIS — E03.9 ACQUIRED HYPOTHYROIDISM: ICD-10-CM

## 2025-03-03 RX ORDER — THYROID 90 MG/1
TABLET ORAL
Qty: 90 TABLET | Refills: 0 | Status: SHIPPED | OUTPATIENT
Start: 2025-03-03 | End: 2026-03-03

## 2025-03-24 ENCOUNTER — PHARMACY VISIT (OUTPATIENT)
Dept: PHARMACY | Facility: CLINIC | Age: 40
End: 2025-03-24
Payer: COMMERCIAL

## 2025-03-24 PROCEDURE — RXMED WILLOW AMBULATORY MEDICATION CHARGE

## 2025-04-22 LAB
25(OH)D3+25(OH)D2 SERPL-MCNC: 15 NG/ML (ref 30–100)
ALBUMIN SERPL-MCNC: 4.3 G/DL (ref 3.6–5.1)
ALP SERPL-CCNC: 85 U/L (ref 31–125)
ALT SERPL-CCNC: 14 U/L (ref 6–29)
ANION GAP SERPL CALCULATED.4IONS-SCNC: 10 MMOL/L (CALC) (ref 7–17)
AST SERPL-CCNC: 18 U/L (ref 10–30)
BASOPHILS # BLD AUTO: 31 CELLS/UL (ref 0–200)
BASOPHILS NFR BLD AUTO: 0.6 %
BILIRUB SERPL-MCNC: 0.7 MG/DL (ref 0.2–1.2)
BUN SERPL-MCNC: 10 MG/DL (ref 7–25)
CALCIUM SERPL-MCNC: 9.1 MG/DL (ref 8.6–10.2)
CHLORIDE SERPL-SCNC: 101 MMOL/L (ref 98–110)
CHOLEST SERPL-MCNC: 213 MG/DL
CHOLEST/HDLC SERPL: 2.3 (CALC)
CO2 SERPL-SCNC: 25 MMOL/L (ref 20–32)
CREAT SERPL-MCNC: 0.63 MG/DL (ref 0.5–0.99)
EGFRCR SERPLBLD CKD-EPI 2021: 114 ML/MIN/1.73M2
EOSINOPHIL # BLD AUTO: 21 CELLS/UL (ref 15–500)
EOSINOPHIL NFR BLD AUTO: 0.4 %
ERYTHROCYTE [DISTWIDTH] IN BLOOD BY AUTOMATED COUNT: 11.5 % (ref 11–15)
EST. AVERAGE GLUCOSE BLD GHB EST-MCNC: 209 MG/DL
EST. AVERAGE GLUCOSE BLD GHB EST-SCNC: 11.6 MMOL/L
GLUCOSE SERPL-MCNC: 283 MG/DL (ref 65–139)
HBA1C MFR BLD: 8.9 %
HCT VFR BLD AUTO: 44.2 % (ref 35–45)
HDLC SERPL-MCNC: 91 MG/DL
HGB BLD-MCNC: 15.1 G/DL (ref 11.7–15.5)
LDLC SERPL CALC-MCNC: 106 MG/DL (CALC)
LYMPHOCYTES # BLD AUTO: 1368 CELLS/UL (ref 850–3900)
LYMPHOCYTES NFR BLD AUTO: 26.3 %
MCH RBC QN AUTO: 32.8 PG (ref 27–33)
MCHC RBC AUTO-ENTMCNC: 34.2 G/DL (ref 32–36)
MCV RBC AUTO: 96.1 FL (ref 80–100)
MONOCYTES # BLD AUTO: 452 CELLS/UL (ref 200–950)
MONOCYTES NFR BLD AUTO: 8.7 %
NEUTROPHILS # BLD AUTO: 3328 CELLS/UL (ref 1500–7800)
NEUTROPHILS NFR BLD AUTO: 64 %
NONHDLC SERPL-MCNC: 122 MG/DL (CALC)
PLATELET # BLD AUTO: 215 THOUSAND/UL (ref 140–400)
PMV BLD REES-ECKER: 10.5 FL (ref 7.5–12.5)
POTASSIUM SERPL-SCNC: 3.9 MMOL/L (ref 3.5–5.3)
PROT SERPL-MCNC: 6.5 G/DL (ref 6.1–8.1)
RBC # BLD AUTO: 4.6 MILLION/UL (ref 3.8–5.1)
SODIUM SERPL-SCNC: 136 MMOL/L (ref 135–146)
T3FREE SERPL-MCNC: 3.5 PG/ML (ref 2.3–4.2)
T4 FREE SERPL-MCNC: 0.9 NG/DL (ref 0.8–1.8)
TRIGL SERPL-MCNC: 69 MG/DL
TSH SERPL-ACNC: 1.12 MIU/L
WBC # BLD AUTO: 5.2 THOUSAND/UL (ref 3.8–10.8)

## 2025-04-28 ENCOUNTER — APPOINTMENT (OUTPATIENT)
Dept: ENDOCRINOLOGY | Facility: CLINIC | Age: 40
End: 2025-04-28
Payer: COMMERCIAL

## 2025-04-28 VITALS
SYSTOLIC BLOOD PRESSURE: 114 MMHG | BODY MASS INDEX: 22.44 KG/M2 | HEIGHT: 67 IN | DIASTOLIC BLOOD PRESSURE: 80 MMHG | HEART RATE: 92 BPM | WEIGHT: 143 LBS

## 2025-04-28 DIAGNOSIS — Z96.41 INSULIN PUMP STATUS: ICD-10-CM

## 2025-04-28 DIAGNOSIS — E10.9 TYPE 1 DIABETES MELLITUS WITHOUT COMPLICATION: Primary | ICD-10-CM

## 2025-04-28 PROCEDURE — 3074F SYST BP LT 130 MM HG: CPT | Performed by: INTERNAL MEDICINE

## 2025-04-28 PROCEDURE — 99214 OFFICE O/P EST MOD 30 MIN: CPT | Performed by: INTERNAL MEDICINE

## 2025-04-28 PROCEDURE — 3079F DIAST BP 80-89 MM HG: CPT | Performed by: INTERNAL MEDICINE

## 2025-04-28 PROCEDURE — 1036F TOBACCO NON-USER: CPT | Performed by: INTERNAL MEDICINE

## 2025-04-28 PROCEDURE — RXMED WILLOW AMBULATORY MEDICATION CHARGE

## 2025-04-28 PROCEDURE — 3008F BODY MASS INDEX DOCD: CPT | Performed by: INTERNAL MEDICINE

## 2025-04-28 RX ORDER — BLOOD-GLUCOSE SENSOR
EACH MISCELLANEOUS
Qty: 9 EACH | Refills: 3 | Status: SHIPPED | OUTPATIENT
Start: 2025-04-28

## 2025-04-28 ASSESSMENT — ENCOUNTER SYMPTOMS
FREQUENCY: 0
DIARRHEA: 0
CONSTIPATION: 0
APPETITE CHANGE: 0
SORE THROAT: 0
POLYDIPSIA: 0
ABDOMINAL PAIN: 0
NERVOUS/ANXIOUS: 0
FEVER: 0
ARTHRALGIAS: 0
COUGH: 0
HEADACHES: 0
SHORTNESS OF BREATH: 0
NAUSEA: 0
VOMITING: 0

## 2025-04-28 NOTE — PATIENT INSTRUCTIONS
RECOMMENDATIONS  Upgrade to DexCom G7 with OmniPod  Follow up 3-4 months  If poor communication persists, check with OmniPod ([employer]) for troubleshooting.

## 2025-04-28 NOTE — PROGRESS NOTES
History Of Present Illness  Erica Carcamo is a 39 y.o. female     Duration of type 1 diabetes mellitus:  29 years     History of lipohypertrophy from prior injections at abdomen.      Semaglutide 0.5 mg/week provided by Dr. Milan  Weight loss 15 lbs.      Insulin pump status  OmniPod 5  Insulin:  Novolog     Using manual mode for at least the past 2 weeks.  Failure of communication between DexCom and OmniPod.    Automated mode: 0%   Manual Basal total 24.4 units/day  MN 1  14 1.1  18 1 unit/h     Bolus   MN-12  1 unit per 5.5 gm carb  12-MN  1 unit per 4 gm carb     Correction factor 50  Target glucose 110  Insulin duration 3 hours     Changing pod every 3 days.     DexCom G6  Patient is testing glucose 288 times daily  Records not communicated to OmniPod    Past Medical History  She has a past medical history of Anxiety, Diabetes mellitus (Multi), Disease of thyroid gland, Headache, and Urinary tract infection.    Surgical History  She has a past surgical history that includes Other surgical history (2022); Other surgical history (2022);  section, low transverse; and Tubal ligation.     Social History  She reports that she has never smoked. She has never been exposed to tobacco smoke. She has never used smokeless tobacco. She reports current alcohol use. She reports that she does not use drugs.    Family History  Family History[1]    Medications  Current Outpatient Medications   Medication Instructions    buPROPion XL (WELLBUTRIN XL) 300 mg, oral, Every morning, Do not crush, chew, or split.    Dexcom G6  misc     Dexcom G6 Sensor device For continuous glucose monitoring.  Change every 10 days.    Dexcom G6 Transmitter device For continuous glucose monitoring.  Change every 90 days.    insulin lispro (HumaLOG U-100 Insulin) 100 unit/mL injection Use via pump up to 90 units per day    LORazepam (Ativan) 0.5 mg tablet Take 0.5-1 tablet by mouth once a day as needed for severe anxiety.  "Do not take and drive.    Omnipod 5 G6-G7 Pods, Gen 5, cartridge 1 each, subcutaneous, Every 72 hours    semaglutide 0.25 mg or 0.5 mg (2 mg/3 mL) pen injector INJECT 0.25 MG UNDER THE SKIN ONCE WEEKLY THEN INCREASE TO 0.5MG UNDER THE SKIN ONCE WEEKLY    sertraline (ZOLOFT) 150 mg, oral, Daily    thyroid, pork, (NP Thyroid) 90 mg tablet TAKE 1 TABLET BY MOUTH ON AN EMPTY STOMACH AS DIRECTED       Allergies  Penicillin v, Gluten, and Penicillamine    Review of Systems   Constitutional:  Negative for appetite change and fever.   HENT:  Negative for sore throat.         Denies dry mouth   Eyes:  Negative for visual disturbance.   Respiratory:  Negative for cough and shortness of breath.    Cardiovascular:  Negative for chest pain.   Gastrointestinal:  Negative for abdominal pain, constipation, diarrhea, nausea and vomiting.   Endocrine: Negative for polydipsia and polyuria.   Genitourinary:  Negative for frequency.   Musculoskeletal:  Negative for arthralgias.   Skin:  Negative for rash.   Neurological:  Negative for headaches.   Psychiatric/Behavioral:  The patient is not nervous/anxious.          Last Recorded Vitals  Blood pressure 114/80, pulse 92, height 1.702 m (5' 7\"), weight 64.9 kg (143 lb).    Physical Exam  Constitutional:       General: She is not in acute distress.  HENT:      Head: Normocephalic.      Mouth/Throat:      Mouth: Mucous membranes are moist.   Eyes:      Extraocular Movements: Extraocular movements intact.   Neck:      Thyroid: No thyroid mass or thyromegaly.   Cardiovascular:      Pulses:           Radial pulses are 2+ on the right side and 2+ on the left side.   Musculoskeletal:      Right lower leg: No edema.      Left lower leg: No edema.      Right foot: No deformity.      Left foot: No deformity.   Lymphadenopathy:      Cervical: No cervical adenopathy.   Skin:     Comments: No foot sores   Neurological:      Mental Status: She is alert.      Motor: No tremor.   Psychiatric:         " Mood and Affect: Affect normal.          Relevant Results  GLUCOSE (mg/dL)   Date Value   04/21/2025 283 (H)     Glucose   Date Value   07/18/2024 257 mg/dL (H)   01/31/2024 306 mg/dL (H)   06/29/2023 135 MG/DL (H)   03/29/2023 138 MG/DL (H)   03/28/2023 125 MG/DL (H)     POC HEMOGLOBIN A1c (%)   Date Value   01/22/2024 9.7 (A)   10/02/2023 7.9 (A)     HEMOGLOBIN A1c (%)   Date Value   04/21/2025 8.9 (H)     Hemoglobin A1C (%)   Date Value   07/18/2024 7.8 (H)   01/31/2024 8.5 (H)   06/29/2023 9.2 (H)   03/26/2023 10.0 (H)     CARBON DIOXIDE (mmol/L)   Date Value   04/21/2025 25     Bicarbonate   Date Value   07/18/2024 29 mmol/L   01/31/2024 27 mmol/L   06/29/2023 27 MMOL/L   03/29/2023 25 MMOL/L   03/28/2023 24 MMOL/L     UREA NITROGEN (BUN) (mg/dL)   Date Value   04/21/2025 10     Urea Nitrogen   Date Value   07/18/2024 7 mg/dL   01/31/2024 7 mg/dL   06/29/2023 5 MG/DL (L)   03/29/2023 3 MG/DL (L)   03/28/2023 4 MG/DL (L)     Creatinine   Date Value   07/18/2024 0.70 mg/dL   01/31/2024 0.74 mg/dL   06/29/2023 0.6 MG/DL   03/29/2023 0.6 MG/DL   03/28/2023 0.7 MG/DL     CREATININE (mg/dL)   Date Value   04/21/2025 0.63     Lab Results   Component Value Date    CHOL 213 (H) 04/21/2025    CHOL 217 (H) 07/18/2024    CHOL 205 (H) 06/29/2023     Lab Results   Component Value Date    HDL 91 04/21/2025    .3 07/18/2024     06/29/2023     Lab Results   Component Value Date    LDLCALC 106 (H) 04/21/2025    LDLCALC 102 (H) 07/18/2024    LDLCALC 91 06/29/2023     Lab Results   Component Value Date    TRIG 69 04/21/2025    TRIG 64 07/18/2024    TRIG 50 06/29/2023     Lab Results   Component Value Date    TSH 1.12 04/21/2025         IMPRESSION  TYPE 1 DIABETES MELLITUS  INSULIN PUMP STATUS  DexCom data not available and not communicating with her OmniPod.    Borderline LDL cholesterol  No other cardiac risk factors      RECOMMENDATIONS  Upgrade to DexCom G7 with OmniPod  Follow up 3-4 months  If poor communication  persists, check with OmniPod ([employer]) for troubleshooting.            [1]   Family History  Problem Relation Name Age of Onset    Hyperlipidemia Mother Savanah Gaylegen         borderline    Depression Mother Savanah Gaylegen     Hypertension Father Ismael Pipersukumar         borderline    Hyperlipidemia Father Ismael Pipersukumar     Alcohol abuse Father Ismael Eloy     Arthritis Father Ismael Eloy     Depression Father Ismael Eloy     Stroke Father Ismael Eloy       Never smoker

## 2025-04-28 NOTE — LETTER
2025     Ankur Milan MD  [address] [address]  [place] [address]    Patient: Erica Carcamo   YOB: 1985   Date of Visit: 2025       Dear Dr. Ankur Milan MD:    Thank you for referring Erica Carcamo to me for evaluation. Below are my notes for this consultation.  If you have questions, please do not hesitate to call me. I look forward to following your patient along with you.       Sincerely,     Tom Oliva MD      CC: No Recipients  ______________________________________________________________________________________    History Of Present Illness  Erica Carcamo is a 39 y.o. female     Duration of type 1 diabetes mellitus:  29 years     History of lipohypertrophy from prior injections at abdomen.      Semaglutide 0.5 mg/week provided by Dr. Milan  Weight loss 15 lbs.      Insulin pump status  OmniPod 5  Insulin:  Novolog     Using manual mode for at least the past 2 weeks.  Failure of communication between DexCom and OmniPod.    Automated mode: 0%   Manual Basal total 24.4 units/day  MN 1  14 1.1  18 1 unit/h     Bolus   MN-12  1 unit per 5.5 gm carb  12-MN  1 unit per 4 gm carb     Correction factor 50  Target glucose 110  Insulin duration 3 hours     Changing pod every 3 days.     DexCom G6  Patient is testing glucose 288 times daily  Records not communicated to OmniPod    Past Medical History  She has a past medical history of Anxiety, Diabetes mellitus (Multi), Disease of thyroid gland, Headache, and Urinary tract infection.    Surgical History  She has a past surgical history that includes Other surgical history (2022); Other surgical history (2022);  section, low transverse; and Tubal ligation.     Social History  She reports that she has never smoked. She has never been exposed to tobacco smoke. She has never used smokeless tobacco. She reports current alcohol use. She reports that she does not use drugs.    Family History  Family  "History[1]    Medications  Current Outpatient Medications   Medication Instructions   • buPROPion XL (WELLBUTRIN XL) 300 mg, oral, Every morning, Do not crush, chew, or split.   • Dexcom G6  misc    • Dexcom G6 Sensor device For continuous glucose monitoring.  Change every 10 days.   • Dexcom G6 Transmitter device For continuous glucose monitoring.  Change every 90 days.   • insulin lispro (HumaLOG U-100 Insulin) 100 unit/mL injection Use via pump up to 90 units per day   • LORazepam (Ativan) 0.5 mg tablet Take 0.5-1 tablet by mouth once a day as needed for severe anxiety. Do not take and drive.   • Omnipod 5 G6-G7 Pods, Gen 5, cartridge 1 each, subcutaneous, Every 72 hours   • semaglutide 0.25 mg or 0.5 mg (2 mg/3 mL) pen injector INJECT 0.25 MG UNDER THE SKIN ONCE WEEKLY THEN INCREASE TO 0.5MG UNDER THE SKIN ONCE WEEKLY   • sertraline (ZOLOFT) 150 mg, oral, Daily   • thyroid, pork, (NP Thyroid) 90 mg tablet TAKE 1 TABLET BY MOUTH ON AN EMPTY STOMACH AS DIRECTED       Allergies  Penicillin v, Gluten, and Penicillamine    Review of Systems   Constitutional:  Negative for appetite change and fever.   HENT:  Negative for sore throat.         Denies dry mouth   Eyes:  Negative for visual disturbance.   Respiratory:  Negative for cough and shortness of breath.    Cardiovascular:  Negative for chest pain.   Gastrointestinal:  Negative for abdominal pain, constipation, diarrhea, nausea and vomiting.   Endocrine: Negative for polydipsia and polyuria.   Genitourinary:  Negative for frequency.   Musculoskeletal:  Negative for arthralgias.   Skin:  Negative for rash.   Neurological:  Negative for headaches.   Psychiatric/Behavioral:  The patient is not nervous/anxious.          Last Recorded Vitals  Blood pressure 114/80, pulse 92, height 1.702 m (5' 7\"), weight 64.9 kg (143 lb).    Physical Exam  Constitutional:       General: She is not in acute distress.  HENT:      Head: Normocephalic.      Mouth/Throat:      " Mouth: Mucous membranes are moist.   Eyes:      Extraocular Movements: Extraocular movements intact.   Neck:      Thyroid: No thyroid mass or thyromegaly.   Cardiovascular:      Pulses:           Radial pulses are 2+ on the right side and 2+ on the left side.   Musculoskeletal:      Right lower leg: No edema.      Left lower leg: No edema.      Right foot: No deformity.      Left foot: No deformity.   Lymphadenopathy:      Cervical: No cervical adenopathy.   Skin:     Comments: No foot sores   Neurological:      Mental Status: She is alert.      Motor: No tremor.   Psychiatric:         Mood and Affect: Affect normal.          Relevant Results  GLUCOSE (mg/dL)   Date Value   04/21/2025 283 (H)     Glucose   Date Value   07/18/2024 257 mg/dL (H)   01/31/2024 306 mg/dL (H)   06/29/2023 135 MG/DL (H)   03/29/2023 138 MG/DL (H)   03/28/2023 125 MG/DL (H)     POC HEMOGLOBIN A1c (%)   Date Value   01/22/2024 9.7 (A)   10/02/2023 7.9 (A)     HEMOGLOBIN A1c (%)   Date Value   04/21/2025 8.9 (H)     Hemoglobin A1C (%)   Date Value   07/18/2024 7.8 (H)   01/31/2024 8.5 (H)   06/29/2023 9.2 (H)   03/26/2023 10.0 (H)     CARBON DIOXIDE (mmol/L)   Date Value   04/21/2025 25     Bicarbonate   Date Value   07/18/2024 29 mmol/L   01/31/2024 27 mmol/L   06/29/2023 27 MMOL/L   03/29/2023 25 MMOL/L   03/28/2023 24 MMOL/L     UREA NITROGEN (BUN) (mg/dL)   Date Value   04/21/2025 10     Urea Nitrogen   Date Value   07/18/2024 7 mg/dL   01/31/2024 7 mg/dL   06/29/2023 5 MG/DL (L)   03/29/2023 3 MG/DL (L)   03/28/2023 4 MG/DL (L)     Creatinine   Date Value   07/18/2024 0.70 mg/dL   01/31/2024 0.74 mg/dL   06/29/2023 0.6 MG/DL   03/29/2023 0.6 MG/DL   03/28/2023 0.7 MG/DL     CREATININE (mg/dL)   Date Value   04/21/2025 0.63     Lab Results   Component Value Date    CHOL 213 (H) 04/21/2025    CHOL 217 (H) 07/18/2024    CHOL 205 (H) 06/29/2023     Lab Results   Component Value Date    HDL 91 04/21/2025    .3 07/18/2024      06/29/2023     Lab Results   Component Value Date    LDLCALC 106 (H) 04/21/2025    LDLCALC 102 (H) 07/18/2024    LDLCALC 91 06/29/2023     Lab Results   Component Value Date    TRIG 69 04/21/2025    TRIG 64 07/18/2024    TRIG 50 06/29/2023     Lab Results   Component Value Date    TSH 1.12 04/21/2025         IMPRESSION  TYPE 1 DIABETES MELLITUS  INSULIN PUMP STATUS  DexCom data not available and not communicating with her OmniPod.    Borderline LDL cholesterol  No other cardiac risk factors      RECOMMENDATIONS  Upgrade to DexCom G7 with OmniPod  Follow up 3-4 months  If poor communication persists, check with OmniPod ([employer]) for troubleshooting.               [1]  Family History  Problem Relation Name Age of Onset   • Hyperlipidemia Mother Savanah Lyons         borderline   • Depression Mother Savanah Lyons    • Hypertension Father Ismael Lyons         borderline   • Hyperlipidemia Father Ismael Lyons    • Alcohol abuse Father Ismael Lyons    • Arthritis Father Ismael Lyons    • Depression Father Ismael Lyons    • Stroke Father Ismael Lyons         [1]  Family History  Problem Relation Name Age of Onset   • Hyperlipidemia Mother Savanah Lyons         borderline   • Depression Mother Saavnah Lyons    • Hypertension Father Ismael Lyons         borderline   • Hyperlipidemia Father Ismael Lyons    • Alcohol abuse Father Ismael Lyons    • Arthritis Father Ismael Lyons    • Depression Father Ismael Lyons    • Stroke Father Ismael Lyons

## 2025-04-29 ENCOUNTER — PHARMACY VISIT (OUTPATIENT)
Dept: PHARMACY | Facility: CLINIC | Age: 40
End: 2025-04-29
Payer: COMMERCIAL

## 2025-04-29 PROCEDURE — RXMED WILLOW AMBULATORY MEDICATION CHARGE

## 2025-04-30 ENCOUNTER — PHARMACY VISIT (OUTPATIENT)
Dept: PHARMACY | Facility: CLINIC | Age: 40
End: 2025-04-30
Payer: COMMERCIAL

## 2025-05-13 DIAGNOSIS — E10.9 TYPE 1 DIABETES MELLITUS WITHOUT COMPLICATION: ICD-10-CM

## 2025-05-13 PROCEDURE — RXMED WILLOW AMBULATORY MEDICATION CHARGE

## 2025-05-13 RX ORDER — INSULIN PMP CART,AUT,G6/7,CNTR
1 EACH SUBCUTANEOUS
Qty: 30 EACH | Refills: 3 | Status: SHIPPED | OUTPATIENT
Start: 2025-05-13 | End: 2025-08-12

## 2025-05-13 NOTE — TELEPHONE ENCOUNTER
Requested Medication: Omni Pods     Pharmacy:  [facility]     Last Filled: 2/5/25  Last office visit: 4/28/25  Next follow up:  8/18/25

## 2025-05-14 ENCOUNTER — PHARMACY VISIT (OUTPATIENT)
Dept: PHARMACY | Facility: CLINIC | Age: 40
End: 2025-05-14
Payer: COMMERCIAL

## 2025-06-23 DIAGNOSIS — F41.9 ANXIETY: ICD-10-CM

## 2025-06-23 PROCEDURE — RXMED WILLOW AMBULATORY MEDICATION CHARGE

## 2025-06-23 RX ORDER — BUPROPION HYDROCHLORIDE 300 MG/1
300 TABLET ORAL EVERY MORNING
Qty: 90 TABLET | Refills: 1 | Status: SHIPPED | OUTPATIENT
Start: 2025-06-23 | End: 2025-12-20

## 2025-06-25 NOTE — PROGRESS NOTES
Clinical Pharmacy Appointment  [facility] ([facility])    Patient ID: Erica Carcamo is a 39 y.o. female who presents to Clinical Pharmacy Team to complete a comprehensive medication review (CMR) with a pharmacist as part of the Value Based Insurance Design (VBID) diabetes program. Pharmacy team may also provide assistance in diabetes management per discussion with referring provider and/or endocrinology. Referring Provider: Tom Oliva MD    DM Provider: Hazel mendez Stephen J, MD  Last visit: 4/28/25, Next visit: 8/18/25    [facility] Employee Diabetes Program Enrollment: Renewal  Patient enrolled in  Employee diabetes program for $0 co-pays on diabetes medications/supplies. Enrollment anticipated to be active in 2-4 weeks and will be valid for one year, provided patient meets requirements; remains on  prescription insurance plan, fills eligible medications at  pharmacy, completes annual visit with clinical pharmacy team.   Requested [facility] enrollment date: 6/26/25  PharmD Management Level: Level 2   Pharmacy fill location:  [facility]    Subjective   HPI  DIABETES MELLITUS TYPE 1:    Diagnosed with diabetes: > 5 years  Disease course: not at goal, last A1c = 8.9% on 4/21/25, goal A1c < 7%  Eye exam/Optometry: 7/11/24  Foot exam/Podiatry: unknown    PERTINENT PMH REVIEW:  HLD, hypothyroidism  Retinopathy: Yes  Urinary Tract Infections: Yes - hx pyelonephritis  Negatives: ASCVD, MTC, MEN2, pancreatitis, HTN, obesity    SOCIAL DRIVERS OF HEALTH  Barriers to Adherence  Adherence/Organization: no concerns  Affordability/Accessibility: enrolled in [facility] $0 DM copay program  Side effects: denies    HOME MONITORING  Blood Glucose  Monitoring Device: Dexcom G7   Monitoring Frequency:  rtCGM     Current Readings:  BG log not present at today's visit, denies hypo/hyperglycemic s/sx    Previous:  N/a    Hypoglycemia  Events? denies  Awareness? yes      Objective  "  Allergies[1]  Social History     Social History Narrative    Not on file      Vitals  BP Readings from Last 2 Encounters:   04/28/25 114/80   02/24/25 116/78     Labs  A1C  Lab Results   Component Value Date    HGBA1C 8.9 (H) 04/21/2025    HGBA1C 7.8 (H) 07/18/2024    HGBA1C 8.5 (H) 01/31/2024     BMP  Lab Results   Component Value Date    CALCIUM 9.1 04/21/2025     04/21/2025    K 3.9 04/21/2025    CO2 25 04/21/2025     04/21/2025    BUN 10 04/21/2025    CREATININE 0.63 04/21/2025    EGFR 114 04/21/2025     LFTs  Lab Results   Component Value Date    ALT 14 04/21/2025    AST 18 04/21/2025    ALKPHOS 85 04/21/2025    BILITOT 0.7 04/21/2025     FLP  Lab Results   Component Value Date    TRIG 69 04/21/2025    CHOL 213 (H) 04/21/2025    LDLCALC 106 (H) 04/21/2025    HDL 91 04/21/2025     Urine Microalbumin  No results found for: \"MICROALBCREA\"  Weight Management  Wt Readings from Last 3 Encounters:   04/28/25 64.9 kg (143 lb)   02/24/25 66.2 kg (146 lb)   12/04/24 63.5 kg (140 lb)      There is no height or weight on file to calculate BMI.   Medication Review  Current Outpatient Medications   Medication Instructions    buPROPion XL (WELLBUTRIN XL) 300 mg, oral, Every morning, Do not crush, chew, or split.    Dexcom G7 Sensor device For continuous glucose monitoring. Change every 10 days.    insulin lispro (HumaLOG U-100 Insulin) 100 unit/mL injection Use via pump up to 90 units per day    LORazepam (Ativan) 0.5 mg tablet Take 0.5-1 tablet by mouth once a day as needed for severe anxiety. Do not take and drive.    Omnipod 5 G6-G7 Pods, Gen 5, cartridge 1 each, subcutaneous, Every 72 hours    semaglutide 0.25 mg or 0.5 mg (2 mg/3 mL) pen injector INJECT 0.25 MG UNDER THE SKIN ONCE WEEKLY THEN INCREASE TO 0.5MG UNDER THE SKIN ONCE WEEKLY    sertraline (ZOLOFT) 150 mg, oral, Daily    thyroid, pork, (NP Thyroid) 90 mg tablet TAKE 1 TABLET BY MOUTH ON AN EMPTY STOMACH AS DIRECTED      MEDICATION " RECONCILIATION  Added: none  Changed:    Ozempic - marked as not taking  Removed: none    Drug Interactions:  None warranting change in therapy at this time    CURRENT DIABETES PHARMACOTHERAPY  Insulin lispro via insulin pump (Omnipod)    Comorbidity/Complications Regimen: needs optimization  Lipids:   Statin? no  LDL: not at goal (< 70 mg/dL)  BP:  BP: at goal, < 130/80  ACE/ARB? no  Renal:   eGFR = >= 90 mL/min/BSA (normal)  UACR: unable to assess, lab value not found  ASCVD: primary prevention  Aspirin? no  _______________________________________________________________________    The 10-year ASCVD risk score ([place] CRUZ, et al., 2019) is: 0.4%    Values used to calculate the score:      Age: 39 years      Sex: Female      Is Non- : No      Diabetic: Yes      Tobacco smoker: No      Systolic Blood Pressure: 114 mmHg      Is BP treated: No      HDL Cholesterol: 91 mg/dL      Total Cholesterol: 213 mg/dL    DISCUSSION/NOTES  Annual FUV to renew VBID $0 copay program. Continues to do well with insulin via pump (Omnipod). Denies questions or concerns.  Patient education:  Cholesterol goals in pts w/ DM > 39 yo. Discussed ADA SOC and cholesterol guidelines and goal cholesterol levels. Counseled on ASCVD risk. While pt's 10 year risk is low at this time, pts w/ DM are at higher risk overall and targeting lower LDL is a highly recommended primary prevention strategy.  Goal LDL = < 70 mg/dL.   Encouraged diet, exercise, lifestyle modification.  Counseled pt cholesterol lowering medication (statin) indicated. Pt to discuss w/ endo at next visit.    Assessment/Plan   Problem List Items Addressed This Visit       Type 1 diabetes mellitus with hyperglycemia (Multi) - Primary     Diabetes Mellitus: Type 1  Not at goal, last A1c = 8.9% on 4/21/25, goal A1c < 7%.   Overall, doing well on current regimen, denies adverse effects. Pt to continue following with endo for management.   Continue:      Comorbidity/Complication Regimen: needs optimization  Lipids - LDL not at goal, recommend mod-high intensity statin     Clinical Pharmacist Follow Up: 10-12 months for renewal    Deion DohertyD   Clinical Pharmacist  [phone]    Continue all meds under the continuation of care with the referring provider and clinical pharmacy team. Verbal consent to manage patient's drug therapy was obtained from the patient. They were informed they may decline to participate or withdraw from participation in pharmacy services at any time.         [1]   Allergies  Allergen Reactions    Penicillin V Rash    Gluten Unknown    Penicillamine Unknown

## 2025-06-25 NOTE — PATIENT INSTRUCTIONS
Thank you for entrusting your care to the Clinical Pharmacy Team! We look forward to seeing you again soon.  Please call your clinical pharmacist with any questions or concerns.    You are enrolled in the [facility] Employee Value Based Insurance Design (VBID) program. This program allows you to receive for $0 co-pays on diabetes medications/supplies.  To maintain your eligibility for this program, you must:  Maintain  employee insurance coverage  Fill prescriptions at a  pharmacy for pick-up or home delivery  Complete a visit with a clinical pharmacist at least once annually    Mya Menard, PharmD  P: [phone]    To schedule an appointment, please contact:  P: [phone]

## 2025-06-26 ENCOUNTER — APPOINTMENT (OUTPATIENT)
Dept: PHARMACY | Facility: HOSPITAL | Age: 40
End: 2025-06-26
Payer: COMMERCIAL

## 2025-06-26 DIAGNOSIS — E10.65 TYPE 1 DIABETES MELLITUS WITH HYPERGLYCEMIA (MULTI): Primary | ICD-10-CM

## 2025-06-26 NOTE — Clinical Note
Employee $0 Diabetes Meds/Supplies Program (VBID) visit completed. Concerns: LDL above goal. Now that pt > 39 yo, recommend targeting lower LDL goal of <70 mg/dL for primary ASCVD prevention. Recommend mod intensity statin. Pt counseled, to be discussed at next visit.

## 2025-07-01 ENCOUNTER — PHARMACY VISIT (OUTPATIENT)
Dept: PHARMACY | Facility: CLINIC | Age: 40
End: 2025-07-01
Payer: COMMERCIAL

## 2025-07-01 PROCEDURE — RXMED WILLOW AMBULATORY MEDICATION CHARGE

## 2025-07-22 DIAGNOSIS — E03.9 ACQUIRED HYPOTHYROIDISM: ICD-10-CM

## 2025-07-22 RX ORDER — THYROID 90 MG/1
TABLET ORAL
Qty: 90 TABLET | Refills: 1 | Status: SHIPPED | OUTPATIENT
Start: 2025-07-22 | End: 2026-07-22

## 2025-07-28 ENCOUNTER — PHARMACY VISIT (OUTPATIENT)
Dept: PHARMACY | Facility: CLINIC | Age: 40
End: 2025-07-28
Payer: COMMERCIAL

## 2025-07-28 PROCEDURE — RXMED WILLOW AMBULATORY MEDICATION CHARGE

## 2025-08-05 PROCEDURE — RXMED WILLOW AMBULATORY MEDICATION CHARGE

## 2025-08-11 ENCOUNTER — PHARMACY VISIT (OUTPATIENT)
Dept: PHARMACY | Facility: CLINIC | Age: 40
End: 2025-08-11
Payer: COMMERCIAL

## 2025-08-18 ENCOUNTER — APPOINTMENT (OUTPATIENT)
Dept: ENDOCRINOLOGY | Facility: CLINIC | Age: 40
End: 2025-08-18
Payer: COMMERCIAL

## 2025-12-01 ENCOUNTER — APPOINTMENT (OUTPATIENT)
Dept: ENDOCRINOLOGY | Facility: CLINIC | Age: 40
End: 2025-12-01
Payer: COMMERCIAL